# Patient Record
Sex: FEMALE | Race: BLACK OR AFRICAN AMERICAN | Employment: UNEMPLOYED | URBAN - METROPOLITAN AREA
[De-identification: names, ages, dates, MRNs, and addresses within clinical notes are randomized per-mention and may not be internally consistent; named-entity substitution may affect disease eponyms.]

---

## 2017-01-23 PROBLEM — Z96.0 RETAINED URETHRAL STENT: Status: ACTIVE | Noted: 2017-01-23

## 2017-01-23 PROBLEM — N20.0 KIDNEY STONES: Status: ACTIVE | Noted: 2017-01-23

## 2017-01-23 PROBLEM — N17.9 AKI (ACUTE KIDNEY INJURY) (HCC): Status: ACTIVE | Noted: 2017-01-23

## 2017-01-23 PROBLEM — N23 RENAL COLIC ON LEFT SIDE: Status: ACTIVE | Noted: 2017-01-23

## 2017-01-23 PROBLEM — N13.2 URETERAL STONE WITH HYDRONEPHROSIS: Status: ACTIVE | Noted: 2017-01-23

## 2017-01-30 RX ORDER — CEFAZOLIN SODIUM 2 G/50ML
2 SOLUTION INTRAVENOUS ONCE
Status: CANCELLED | OUTPATIENT
Start: 2017-01-30 | End: 2017-01-30

## 2017-02-17 ENCOUNTER — ANESTHESIA EVENT (OUTPATIENT)
Dept: SURGERY | Age: 62
End: 2017-02-17
Payer: MEDICARE

## 2017-02-20 ENCOUNTER — APPOINTMENT (OUTPATIENT)
Dept: GENERAL RADIOLOGY | Age: 62
End: 2017-02-20
Attending: UROLOGY
Payer: MEDICARE

## 2017-02-20 ENCOUNTER — ANESTHESIA (OUTPATIENT)
Dept: SURGERY | Age: 62
End: 2017-02-20
Payer: MEDICARE

## 2017-02-20 ENCOUNTER — HOSPITAL ENCOUNTER (OUTPATIENT)
Age: 62
Setting detail: OUTPATIENT SURGERY
Discharge: HOME | End: 2017-02-20
Attending: UROLOGY | Admitting: UROLOGY
Payer: MEDICARE

## 2017-02-20 ENCOUNTER — SURGERY (OUTPATIENT)
Age: 62
End: 2017-02-20

## 2017-02-20 VITALS
WEIGHT: 109 LBS | HEART RATE: 83 BPM | BODY MASS INDEX: 18.16 KG/M2 | TEMPERATURE: 97.9 F | HEIGHT: 65 IN | DIASTOLIC BLOOD PRESSURE: 70 MMHG | OXYGEN SATURATION: 97 % | RESPIRATION RATE: 20 BRPM | SYSTOLIC BLOOD PRESSURE: 108 MMHG

## 2017-02-20 LAB
BUN BLD-MCNC: 37 MG/DL (ref 7–18)
CHLORIDE BLD-SCNC: 109 MMOL/L (ref 100–108)
GLUCOSE BLD STRIP.AUTO-MCNC: 107 MG/DL (ref 74–106)
HCT VFR BLD CALC: 45 % (ref 36–49)
HGB BLD-MCNC: 15.3 G/DL (ref 12–16)
POTASSIUM BLD-SCNC: 4.2 MMOL/L (ref 3.5–5.5)
SODIUM BLD-SCNC: 142 MMOL/L (ref 136–145)

## 2017-02-20 PROCEDURE — C1769 GUIDE WIRE: HCPCS | Performed by: UROLOGY

## 2017-02-20 PROCEDURE — 74011250636 HC RX REV CODE- 250/636: Performed by: ANESTHESIOLOGY

## 2017-02-20 PROCEDURE — 76210000016 HC OR PH I REC 1 TO 1.5 HR: Performed by: UROLOGY

## 2017-02-20 PROCEDURE — 76010000161 HC OR TIME 1 TO 1.5 HR INTENSV-TIER 1: Performed by: UROLOGY

## 2017-02-20 PROCEDURE — C1758 CATHETER, URETERAL: HCPCS | Performed by: UROLOGY

## 2017-02-20 PROCEDURE — 74011250636 HC RX REV CODE- 250/636

## 2017-02-20 PROCEDURE — 82947 ASSAY GLUCOSE BLOOD QUANT: CPT

## 2017-02-20 PROCEDURE — 77030032490 HC SLV COMPR SCD KNE COVD -B: Performed by: UROLOGY

## 2017-02-20 PROCEDURE — 77030020269 HC MISC IMPL: Performed by: UROLOGY

## 2017-02-20 PROCEDURE — 74011250636 HC RX REV CODE- 250/636: Performed by: NURSE ANESTHETIST, CERTIFIED REGISTERED

## 2017-02-20 PROCEDURE — 74011250637 HC RX REV CODE- 250/637: Performed by: NURSE ANESTHETIST, CERTIFIED REGISTERED

## 2017-02-20 PROCEDURE — 74011250636 HC RX REV CODE- 250/636: Performed by: UROLOGY

## 2017-02-20 PROCEDURE — 74011636320 HC RX REV CODE- 636/320: Performed by: UROLOGY

## 2017-02-20 PROCEDURE — 76060000033 HC ANESTHESIA 1 TO 1.5 HR: Performed by: UROLOGY

## 2017-02-20 PROCEDURE — 77030018836 HC SOL IRR NACL ICUM -A: Performed by: UROLOGY

## 2017-02-20 PROCEDURE — 77030020782 HC GWN BAIR PAWS FLX 3M -B: Performed by: UROLOGY

## 2017-02-20 PROCEDURE — 82360 CALCULUS ASSAY QUANT: CPT | Performed by: UROLOGY

## 2017-02-20 PROCEDURE — 77030012961 HC IRR KT CYSTO/TUR ICUM -A: Performed by: UROLOGY

## 2017-02-20 PROCEDURE — 74420 UROGRAPHY RTRGR +-KUB: CPT

## 2017-02-20 PROCEDURE — 77030006974 HC BSKT URET RTVR BSC -C: Performed by: UROLOGY

## 2017-02-20 PROCEDURE — 74011000250 HC RX REV CODE- 250

## 2017-02-20 PROCEDURE — 76210000026 HC REC RM PH II 1 TO 1.5 HR: Performed by: UROLOGY

## 2017-02-20 RX ORDER — DEXAMETHASONE SODIUM PHOSPHATE 4 MG/ML
INJECTION, SOLUTION INTRA-ARTICULAR; INTRALESIONAL; INTRAMUSCULAR; INTRAVENOUS; SOFT TISSUE AS NEEDED
Status: DISCONTINUED | OUTPATIENT
Start: 2017-02-20 | End: 2017-02-20 | Stop reason: HOSPADM

## 2017-02-20 RX ORDER — OXYCODONE AND ACETAMINOPHEN 5; 325 MG/1; MG/1
1-2 TABLET ORAL
Qty: 24 TAB | Refills: 0 | Status: ON HOLD | OUTPATIENT
Start: 2017-02-20 | End: 2022-03-11

## 2017-02-20 RX ORDER — SODIUM CHLORIDE 0.9 % (FLUSH) 0.9 %
5-10 SYRINGE (ML) INJECTION AS NEEDED
Status: DISCONTINUED | OUTPATIENT
Start: 2017-02-20 | End: 2017-02-20 | Stop reason: HOSPADM

## 2017-02-20 RX ORDER — CIPROFLOXACIN 500 MG/1
500 TABLET ORAL 2 TIMES DAILY
Qty: 6 TAB | Refills: 0 | Status: ON HOLD | OUTPATIENT
Start: 2017-02-20 | End: 2022-03-11

## 2017-02-20 RX ORDER — FAMOTIDINE 20 MG/1
20 TABLET, FILM COATED ORAL ONCE
Status: COMPLETED | OUTPATIENT
Start: 2017-02-20 | End: 2017-02-20

## 2017-02-20 RX ORDER — MIDAZOLAM HYDROCHLORIDE 1 MG/ML
INJECTION, SOLUTION INTRAMUSCULAR; INTRAVENOUS AS NEEDED
Status: DISCONTINUED | OUTPATIENT
Start: 2017-02-20 | End: 2017-02-20 | Stop reason: HOSPADM

## 2017-02-20 RX ORDER — PROPOFOL 10 MG/ML
INJECTION, EMULSION INTRAVENOUS AS NEEDED
Status: DISCONTINUED | OUTPATIENT
Start: 2017-02-20 | End: 2017-02-20 | Stop reason: HOSPADM

## 2017-02-20 RX ORDER — CEFAZOLIN SODIUM 2 G/50ML
2 SOLUTION INTRAVENOUS ONCE
Status: COMPLETED | OUTPATIENT
Start: 2017-02-20 | End: 2017-02-20

## 2017-02-20 RX ORDER — OXYCODONE AND ACETAMINOPHEN 5; 325 MG/1; MG/1
1-2 TABLET ORAL
Status: DISCONTINUED | OUTPATIENT
Start: 2017-02-20 | End: 2017-02-20 | Stop reason: HOSPADM

## 2017-02-20 RX ORDER — ONDANSETRON 2 MG/ML
INJECTION INTRAMUSCULAR; INTRAVENOUS AS NEEDED
Status: DISCONTINUED | OUTPATIENT
Start: 2017-02-20 | End: 2017-02-20 | Stop reason: HOSPADM

## 2017-02-20 RX ORDER — TAMSULOSIN HYDROCHLORIDE 0.4 MG/1
0.4 CAPSULE ORAL DAILY
Qty: 14 CAP | Refills: 0 | Status: ON HOLD | OUTPATIENT
Start: 2017-02-20 | End: 2022-03-11

## 2017-02-20 RX ORDER — SODIUM CHLORIDE 0.9 % (FLUSH) 0.9 %
5-10 SYRINGE (ML) INJECTION EVERY 8 HOURS
Status: DISCONTINUED | OUTPATIENT
Start: 2017-02-20 | End: 2017-02-20 | Stop reason: HOSPADM

## 2017-02-20 RX ORDER — KETOROLAC TROMETHAMINE 30 MG/ML
INJECTION, SOLUTION INTRAMUSCULAR; INTRAVENOUS AS NEEDED
Status: DISCONTINUED | OUTPATIENT
Start: 2017-02-20 | End: 2017-02-20 | Stop reason: HOSPADM

## 2017-02-20 RX ORDER — MORPHINE SULFATE 10 MG/ML
INJECTION, SOLUTION INTRAMUSCULAR; INTRAVENOUS AS NEEDED
Status: DISCONTINUED | OUTPATIENT
Start: 2017-02-20 | End: 2017-02-20 | Stop reason: HOSPADM

## 2017-02-20 RX ORDER — LIDOCAINE HYDROCHLORIDE 20 MG/ML
INJECTION, SOLUTION EPIDURAL; INFILTRATION; INTRACAUDAL; PERINEURAL AS NEEDED
Status: DISCONTINUED | OUTPATIENT
Start: 2017-02-20 | End: 2017-02-20 | Stop reason: HOSPADM

## 2017-02-20 RX ORDER — DEXTROSE 50 % IN WATER (D50W) INTRAVENOUS SYRINGE
25-50 AS NEEDED
Status: DISCONTINUED | OUTPATIENT
Start: 2017-02-20 | End: 2017-02-20 | Stop reason: HOSPADM

## 2017-02-20 RX ORDER — HYDROMORPHONE HYDROCHLORIDE 2 MG/ML
0.5 INJECTION, SOLUTION INTRAMUSCULAR; INTRAVENOUS; SUBCUTANEOUS
Status: DISCONTINUED | OUTPATIENT
Start: 2017-02-20 | End: 2017-02-20 | Stop reason: HOSPADM

## 2017-02-20 RX ORDER — MAGNESIUM SULFATE 100 %
4 CRYSTALS MISCELLANEOUS AS NEEDED
Status: DISCONTINUED | OUTPATIENT
Start: 2017-02-20 | End: 2017-02-20 | Stop reason: HOSPADM

## 2017-02-20 RX ORDER — INSULIN LISPRO 100 [IU]/ML
INJECTION, SOLUTION INTRAVENOUS; SUBCUTANEOUS ONCE
Status: DISCONTINUED | OUTPATIENT
Start: 2017-02-20 | End: 2017-02-20 | Stop reason: HOSPADM

## 2017-02-20 RX ORDER — SODIUM CHLORIDE, SODIUM LACTATE, POTASSIUM CHLORIDE, CALCIUM CHLORIDE 600; 310; 30; 20 MG/100ML; MG/100ML; MG/100ML; MG/100ML
75 INJECTION, SOLUTION INTRAVENOUS CONTINUOUS
Status: DISCONTINUED | OUTPATIENT
Start: 2017-02-20 | End: 2017-02-20 | Stop reason: HOSPADM

## 2017-02-20 RX ADMIN — MIDAZOLAM HYDROCHLORIDE 2 MG: 1 INJECTION, SOLUTION INTRAMUSCULAR; INTRAVENOUS at 08:28

## 2017-02-20 RX ADMIN — FAMOTIDINE 20 MG: 20 TABLET ORAL at 06:42

## 2017-02-20 RX ADMIN — HYDROMORPHONE HYDROCHLORIDE 0.5 MG: 2 INJECTION, SOLUTION INTRAMUSCULAR; INTRAVENOUS; SUBCUTANEOUS at 10:12

## 2017-02-20 RX ADMIN — KETOROLAC TROMETHAMINE 30 MG: 30 INJECTION, SOLUTION INTRAMUSCULAR; INTRAVENOUS at 08:52

## 2017-02-20 RX ADMIN — CEFAZOLIN SODIUM 2 G: 2 SOLUTION INTRAVENOUS at 08:36

## 2017-02-20 RX ADMIN — LIDOCAINE HYDROCHLORIDE 100 MG: 20 INJECTION, SOLUTION EPIDURAL; INFILTRATION; INTRACAUDAL; PERINEURAL at 08:33

## 2017-02-20 RX ADMIN — ONDANSETRON 4 MG: 2 INJECTION INTRAMUSCULAR; INTRAVENOUS at 08:52

## 2017-02-20 RX ADMIN — SODIUM CHLORIDE, SODIUM LACTATE, POTASSIUM CHLORIDE, AND CALCIUM CHLORIDE 75 ML/HR: 600; 310; 30; 20 INJECTION, SOLUTION INTRAVENOUS at 06:42

## 2017-02-20 RX ADMIN — PROPOFOL 150 MG: 10 INJECTION, EMULSION INTRAVENOUS at 08:33

## 2017-02-20 RX ADMIN — MORPHINE SULFATE 10 MG: 10 INJECTION, SOLUTION INTRAMUSCULAR; INTRAVENOUS at 08:28

## 2017-02-20 RX ADMIN — DEXAMETHASONE SODIUM PHOSPHATE 4 MG: 4 INJECTION, SOLUTION INTRA-ARTICULAR; INTRALESIONAL; INTRAMUSCULAR; INTRAVENOUS; SOFT TISSUE at 08:52

## 2017-02-20 RX ADMIN — HYDROMORPHONE HYDROCHLORIDE 0.5 MG: 2 INJECTION, SOLUTION INTRAMUSCULAR; INTRAVENOUS; SUBCUTANEOUS at 10:22

## 2017-02-20 RX ADMIN — SODIUM CHLORIDE, SODIUM LACTATE, POTASSIUM CHLORIDE, AND CALCIUM CHLORIDE: 600; 310; 30; 20 INJECTION, SOLUTION INTRAVENOUS at 08:28

## 2017-02-20 RX ADMIN — IOTHALAMATE MEGLUMINE 12 ML: 600 INJECTION INTRAVASCULAR at 08:50

## 2017-02-20 NOTE — OP NOTES
[address]    Name:  Manuela Flores  MR#:  [id number]  :  1955  Account #:  [de-identified]  Date of Adm:  2017  Date of Surgery:  2017      PREOPERATIVE DIAGNOSIS: Left distal ureteral stones,  hydronephrosis, retained ureteral stent (status post left ureteral stent  placement 2016). POSTOPERATIVE DIAGNOSIS: Left distal ureteral stones,  hydronephrosis, retained ureteral stent (status post left ureteral stent  placement 2016). PROCEDURES PERFORMED: Cystoscopy with left retrograde  pyelogram, left ureteroscopy, laser lithotripsy, basket stone extraction,  ureteral stent exchange, and fluoroscopy roughly 1 hour. ANESTHESIA: General.    ANTIBIOTICS: Ancef. Urine culture negative. SPECIMENS REMOVED: Left ureteral stone. ESTIMATED BLOOD LOSS: 1 mL. DRAINS: A 6 x 24-Maltese double-J ureteral stent. FINDINGS: An impacted 1.3 cm distal ureteral stone and just proximal  to that was a 4 mm distal ureteral stone, and moderate hydronephrosis  seen on retrograde pyelogram.    DISPOSITION: PACU and home. INDICATIONS FOR PROCEDURE: This is a 64-year-old lady who  presented to the [facility] system on 2016, with left renal colic  and sepsis, and was found to have a 1.3 cm impacted distal stone and  a 4 mm proximal to the distal stone with moderate to severe  hydronephrosis. A ureteral stent was placed at that time and she now  presents for stone management after a period of infection and sepsis  treated. DESCRIPTION OF PROCEDURE: After consent was obtained, she  was brought to the operating room and placed in a supine position. Anesthesia was administered. She was placed in the dorsal lithotomy  position, prepped and draped in normal sterile fashion. Intravenous  antibiotics were given. A timeout was conducted. Using a 30-degree lens scope and a Cristina-Maltese sheath,  we performed a cystourethroscopy.  The urethra and the bladder  appeared normal. There was no stone or lesion found in the bladder. The ureteral orifices were in normal position. We found a left stent  emanating from the left ureteral orifice. This was grasped with a  flexible grasper and we passed a Sensor wire through this stent up into  the kidney, which was verified fluoroscopically. The ureteral stent was  then removed. We performed a semi-rigid ureteroscopy, which was  placed in the distal ureter. A retrograde pyelogram revealed a  radiopaque stone in the distal ureter. Also, there was moderate  hydroureteronephrosis down to the level of the stone. We then used a  360 micron laser fiber to break down the stone into several fragments. We used a 1.9 Nitinol basket to remove the fragmented stones. We  also encountered the stone proximal to that, which was 4 mm, and this  was removed. We performed a semi-rigid ureteroscopy all the way to  the renal pelvis, and there was no other stone encountered. With the  sensor wire left in place, a 6 x 24-Portuguese double-J ureteral stent was  backloaded over the wire into the proximal curl in the renal pelvis and a  distal curl in the bladder. The bladder was emptied and bladder  stones removed and sent for analysis. The patient was awakened from  anesthesia and transferred to PACU in stable condition.         MD RASHAUN Albert / ITZ  D:  02/20/2017   09:37  T:  02/20/2017   10:48  Job #:  [id number]

## 2017-02-20 NOTE — H&P (VIEW-ONLY)
Assessment:        ICD-10-CM ICD-9-CM    1. Ureteral stone with hydronephrosis N13.2 592.1 CANCELED: URINE C&S     591    2. Renal colic on left side X52 788.0    3. Retained urethral stent Z96.0 V43.89    4. Kidney stones N20.0 592.0 CANCELED: URINE C&S   5. TIM (acute kidney injury) ([facility] [address] N17.9 584.9        Plan:  1. Kidney stones    S/P Cystoscopy, bilateral retrograde pyelograms, and left double-J stent placement on 12/29/16. Indicated for a Left distal 1.3 cm ureteral calculus, gross hematuria and acute kidney injury with associated left hydronephrosis and forniceal rupture. 2. Left Flank Pain    Rx provided for Percocet 5-325mg #24   Continue on Naprosyn for pain as needed    Continue on Zofran for nausea    Continue pushing Fluids     3. Will Plan for cystoscopy, ureteroscopy, holmium laser lithotripsy, left JJ stent exchange. Surgery Letter sent    Needs medical clearance by PCP. Hold Aspirin if possible (h/o stroke )    Discussion:     Risks and benefits of ureteroscopy were reviewed including but not limited to infection, bleeding, pain, ureteral injury which could require open surgery versus prolonged indwelling if ureteral perforation occurs, persistent stone disease, requirement for staged procedure, possible stent, and global anesthesia risks including DVT, MI, CVA, and even death. Patient expressed understanding and desires to proceed with ureteroscopy. Chief Complaint   Patient presents with    Kidney Stone     CT Done 1-18-17       History of present Illness:    Nicholas King is a 59 y.o. female who presents today in consultation for kidney stones. She presented to [place] [address] ER on 12/28/16 c/o severe left sided pain. CT A/P noted an obstructing left ureteral stone with left hydro. Pt was then transferred to [facility] for an emergent Cystoscopy, bilateral retrograde pyelograms, and left double-J stent placement on 12/29/16.    Indicated for a Left distal 1.3 cm ureteral calculus, gross hematuria and acute kidney injury with associated left hydronephrosis and forniceal rupture. She reports that she had one episode of gross hematuria one year ago- with out pain. No other episodes of kidney stones that she knows of. Denies any recent UTI's. Denies any other urinary complaints at this time. Today she reports left sided flank pain. Denies any gross hematuria, dysuria, frequency, or urgency. No f/n/v/c. Pt has MS, is followed by neurology. Pt is a retired [employer]. KENIA 1/18/2017   IMPRESSION  Left hydronephrosis has resolved.  Ureteral stent not well visualized proximally. Nonobstructing right renal calculus.  Mild right renal atrophy. CT A/P 12/28/16   IMPRESSION:  1. Significant left hydronephrosis with significant left perinephric fluid. This probably represents acute obstruction secondary to a large distal left ureteral stone. There is another stone in the distal left ureter which appears small and probably nonobstructive. No intra-renal calcification on the left. 2. Nonobstructive right nephrolithiasis. 3. Minor dependent atelectasis in the lungs with some minor bronchiectasis. 4. Nonspecific bowel gas could represent a mild ileus. There is significant left hydronephrosis with significant fluid in the left perinephric space. The left kidney also appears larger than the right with the left measuring about 11.7 cm and the right about 9.6 cm in length. No right hydronephrosis. There is nonobstructive right nephrolithiasis with a stone in the upper pole measuring about 4 mm no left nephrolithiasis but there is significant left hydroureter down to a large ovoid stone at the left ureterovesical junction which measures 1.3 x 0.77 may represent a smaller more proximal left ureteral stone measures about 4 mm.  No right hydroureter.             Review of Systems  Constitutional: Fever: No  Skin: Rash: No  HEENT: Hearing difficulty: No  Eyes: Blurred vision: Yes  Cardiovascular: Chest pain: No  Respiratory: Shortness of breath: No  Gastrointestinal: Nausea/vomiting: No  Musculoskeletal: Back pain: Yes  Neurological: Weakness: No  Psychological: Memory loss: No  Comments/additional findings:     Past Medical History   Diagnosis Date    Gross hematuria     Hypertension     Kidney stones     MS (congenital mitral stenosis)     Stroke (cerebrum) (HCC)      Past Surgical History   Procedure Laterality Date    Hx coronary stent placement       Social History     Social History    Marital status:      Spouse name: N/A    Number of children: N/A    Years of education: N/A     Occupational History    Not on file. Social History Main Topics    Smoking status: Former Smoker    Smokeless tobacco: Never Used    Alcohol use No    Drug use: Not on file    Sexual activity: Not on file     Other Topics Concern    Not on file     Social History Narrative    No narrative on file     No family history on file. Current Outpatient Prescriptions   Medication Sig Dispense Refill    tamsulosin (FLOMAX) 0.4 mg capsule       simvastatin (ZOCOR) 20 mg tablet       lisinopril (PRINIVIL, ZESTRIL) 20 mg tablet       hydroCHLOROthiazide (HYDRODIURIL) 25 mg tablet       cyclobenzaprine (FLEXERIL) 10 mg tablet       clonazePAM (KLONOPIN) 0.5 mg tablet       multivitamin (ONE A DAY) tablet Take 1 Tab by mouth daily.  aspirin (ASPIRIN) 325 mg tablet Take 325 mg by mouth daily.  BACLOFEN PO Take  by mouth.  ergocalciferol (VITAMIN D2) 50,000 unit capsule Take 50,000 Units by mouth.  oxyCODONE-acetaminophen (PERCOCET) 5-325 mg per tablet Take 1-2 Tabs by mouth every four (4) hours as needed for Pain. Max Daily Amount: 12 Tabs.  24 Tab 0     Allergies   Allergen Reactions    Codeine Shortness of Breath          Physical exam:    Visit Vitals    /80    Ht 5' 5\" (1.651 m)    Wt 110 lb (49.9 kg)    BMI 18.3 kg/m2     Constitutional: Well developed, well-nourished female in no acute distress. CV:  No peripheral swelling noted  Respiratory: No respiratory distress or difficulties  Abdomen:  Soft and nontender. No masses. Skin: No bruising or rashes. No petechia. Neuro/Psych:  Patient with appropriate affect. Alert and oriented. Lymphatic:   No enlargement of inguinal lymph nodes. No results found for this or any previous visit. Angle Marlow MD       Medical Documentation is provided with the assistance of Mejai Whyte, medical scribe for Angle Marlow MD

## 2017-02-20 NOTE — PERIOP NOTES
I have reviewed discharge instructions with the patient and daughter, Tati Hall. The patient and daughter verbalized understanding. Patient armband removed and shredded.

## 2017-02-20 NOTE — ANESTHESIA PREPROCEDURE EVALUATION
Anesthetic History               Review of Systems / Medical History  Patient summary reviewed and pertinent labs reviewed    Pulmonary          Smoker         Neuro/Psych       CVA: no residual symptoms  TIA     Cardiovascular    Hypertension: well controlled              Exercise tolerance: <4 METS  Comments: MS   GI/Hepatic/Renal  Within defined limits              Endo/Other  Within defined limits           Other Findings   Comments: Current Smoker? YES       Elective Surgery? Yes       Abstained from smoking 24 hours prior to anesthesia? NO    Risk Factors for Postoperative nausea/vomiting:       History of postoperative nausea/vomiting? NO       Female? YES       Motion sickness? NO       Intended opioid administration for postoperative analgesia?   NO           Physical Exam    Airway  Mallampati: II  TM Distance: 4 - 6 cm  Neck ROM: normal range of motion   Mouth opening: Normal     Cardiovascular  Regular rate and rhythm,  S1 and S2 normal,  no murmur, click, rub, or gallop             Dental    Dentition: Upper partial plate and Full lower dentures     Pulmonary  Breath sounds clear to auscultation               Abdominal  GI exam deferred       Other Findings            Anesthetic Plan    ASA: 3  Anesthesia type: general          Induction: Intravenous  Anesthetic plan and risks discussed with: Patient and Family

## 2017-02-20 NOTE — DISCHARGE INSTRUCTIONS
Cystoscopy: What to Expect at [facility]    A cystoscopy is a procedure that lets a doctor look inside of the bladder and the urethra. The urethra is the tube that carries urine from the bladder to outside the body. The doctor uses a thin, lighted tube called a cystoscope to look for kidney or bladder stones, tumors, bleeding, or infection. After the cystoscopy, your urethra may be sore at first, and it may burn when you urinate for the first few days after the procedure. You may feel the need to urinate more often, and your urine may be pink. These symptoms should get better in 1 or 2 days. You will probably be able to go back to work or most of your usual activities in 1 or 2 days. This care sheet gives you a general idea about how long it will take for you to recover. But each person recovers at a different pace. Follow the steps below to get better as quickly as possible. How can you care for yourself at home? Activity  · Rest when you feel tired. Getting enough sleep will help you recover. · Try to walk each day. Start by walking a little more than you did the day before. Bit by bit, increase the amount you walk. Walking boosts blood flow and helps prevent pneumonia and constipation. · Avoid strenuous activities, such as bicycle riding, jogging, weight lifting, or aerobic exercise, until your doctor says it is okay. · Ask your doctor when you can drive again. · Most people are able to return to work within 1 or 2 days after the procedure. · You may shower and take baths as usual.  · Ask your doctor when it is okay for you to have sex. Diet  · You can eat your normal diet. If your stomach is upset, try bland, low-fat foods like plain rice, broiled chicken, toast, and yogurt. · Drink plenty of fluids (unless your doctor tells you not to). Medicines  · Take pain medicines exactly as directed. ¨ If the doctor gave you a prescription medicine for pain, take it as prescribed.   ¨ If you are not taking a prescription pain medicine, ask your doctor if you can take an over-the-counter medicine. · If you think your pain medicine is making you sick to your stomach:  ¨ Take your medicine after meals (unless your doctor has told you not to). ¨ Ask your doctor for a different pain medicine. · If your doctor prescribed antibiotics, take them as directed. Do not stop taking them just because you feel better. You need to take the full course of antibiotics. Follow-up care is a key part of your treatment and safety. Be sure to make and go to all appointments, and call your doctor if you are having problems. It's also a good idea to know your test results and keep a list of the medicines you take. When should you call for help? Call 911 anytime you think you may need emergency care. For example, call if:  · You passed out (lost consciousness). · You have severe trouble breathing. · You have sudden chest pain and shortness of breath, or you cough up blood. · You have severe belly pain. Call your doctor now or seek immediate medical care if:  · You are sick to your stomach or cannot keep fluids down. · Your urine is still red or you see blood clots after you have urinated several times. · You have trouble passing urine or stool, especially if you have pain or swelling in your lower belly. · You have signs of a blood clot, such as:  ¨ Pain in your calf, back of the knee, thigh, or groin. ¨ Redness and swelling in your leg or groin. · You develop a fever or severe chills. · You have pain in your back just below your rib cage. This is called flank pain. Watch closely for changes in your health, and be sure to contact your doctor if:  · You have pain or burning when you urinate. A burning feeling is normal for a day or two after the test, but call if it does not get better. · You have a frequent urge to urinate but can pass only small amounts of urine. · Your urine is pink, red, or cloudy, or smells bad. It is normal for the urine to have a pinkish color for a few days after the test, but call if it does not get better. Where can you learn more? Go to http://joaquin-chanel.info/. Enter F209 in the search box to learn more about \"Cystoscopy: What to Expect at Home. \"  Current as of: August 12, 2016  Content Version: 11.1  © 2006-2016 [employer]. Care instructions adapted under license by [employer] (which disclaims liability or warranty for this information). If you have questions about a medical condition or this instruction, always ask your healthcare professional. [place] [address] any warranty or liability for your use of this information. Laser Lithotripsy: What to Expect at [address] lithotripsy is a way to treat kidney stones. This treatment uses a laser to break kidney stones into tiny pieces. For several hours after the procedure you may have a burning feeling when you urinate. You may feel the urge to go even if you don't need to. This feeling should go away within a day. Drinking a lot of water can help. Your doctor also may advise you to take medicine that numbs the burning. This medicine is called phenazopyridine. It is available by prescription and over the counter. Brand names include Pyridium and Uristat. Your doctor may prescribe an antibiotic. This will help prevent an infection. You may have some blood in your urine for 2 or 3 days. Your doctor may have placed a small tube inside one of your ureters. Ureters are the tubes that connect the kidneys to the bladder. The small tube the doctor may have placed is called a stent. It may help the stone fragments pass through your body. Your doctor may remove the stent in a few weeks. Most stone fragments that are not removed pass out of the body within 24 hours. But sometimes it can take many weeks.  If you have a large stone, you may need to come back for more treatments. This care sheet gives you a general idea about how long it will take for you to recover. But each person recovers at a different pace. Follow the steps below to feel better as quickly as possible. How can you care for yourself at home? Activity  · Rest as much as you need to after you go home. · You may do your regular activities. But avoid hard exercise or sports for about a week or until there is no blood in your urine. Diet  · You can eat your normal diet after lithotripsy. · Continue to drink plenty of fluids, enough so that your urine is light yellow or clear like water. If you have kidney, heart, or liver disease and have to limit fluids, talk with your doctor before you increase the amount of fluids you drink. Medicines  · Your doctor will tell you if and when you can restart your medicines. He or she will also give you instructions about taking any new medicines. · If you take blood thinners, such as warfarin (Coumadin), clopidogrel (Plavix), or aspirin, be sure to talk to your doctor. He or she will tell you if and when to start taking those medicines again. Make sure that you understand exactly what your doctor wants you to do. · If you take medicine to stop the burning when you urinate, take it exactly as recommended. Call your doctor if you think you are having a problem with your medicine. This medicine may color your urine orange or red. This is normal. You will get more details on the specific medicine your doctor recommends. · If your doctor prescribed antibiotics, take them as directed. Do not stop taking them just because you feel better. You need to take the full course of antibiotics. · Be safe with medicines. Read and follow all instructions on the label. ¨ If the doctor gave you a prescription medicine for pain, take it as prescribed. ¨ If you are not taking a prescription pain medicine, ask your doctor if you can take acetaminophen (Tylenol).  Do not take ibuprofen (Advil, Motrin) or naproxen (Aleve) or similar medicines unless your doctor tells you to. ¨ Do not take two or more pain medicines at the same time unless the doctor told you to. Many pain medicines have acetaminophen, which is Tylenol. Too much acetaminophen (Tylenol) can be harmful. Heat  · Take a warm bath. This may soothe the burning. Other instructions  · Urinate through the strainer the doctor gives you. Save any stone pieces, including those that look like sand or gravel. Take these to your doctor. This will help your doctor find the cause of your stones. Follow-up care is a key part of your treatment and safety. Be sure to make and go to all appointments, and call your doctor if you are having problems. It's also a good idea to know your test results and keep a list of the medicines you take. When should you call for help? Call your doctor now or seek immediate medical care if:  · You have severe pain that does not get better after you take pain medicine. · You have severe pain when you urinate. · You have a fever over 100°F.  · You are not able to urinate within 6 to 8 hours after the procedure. · Your urine is still bright red 48 hours after the procedure. Watch closely for any changes in your health, and be sure to contact your doctor if:  · You have pain or burning when you urinate. A burning sensation is normal for a day or two after the test, but call if it does not get better. · You have a frequent urge to urinate but can pass only small amounts of urine. · Your urine is pink or cloudy or smells bad. It is normal for the urine to have a pinkish color for 2 or 3 days after the test, but call if it does not get better. · You do not get better as expected. Where can you learn more? Go to http://joaquin-chanel.info/. Enter Q239 in the search box to learn more about \"Laser Lithotripsy: What to Expect at Home. \"  Current as of: November 20, 2015  Content Version: 11.1  © 9767-6889 [employer], [employer]. Care instructions adapted under license by [employer] (which disclaims liability or warranty for this information). If you have questions about a medical condition or this instruction, always ask your healthcare professional. [address] any warranty or liability for your use of this information. DISCHARGE SUMMARY from Nurse    The following personal items are in your possession at time of discharge:    Dental Appliances: None  Visual Aid: Glasses        Jewelry: None  Clothing: Pants, Shirt, Undergarments, Socks, Footwear  Other Valuables: None   PATIENT INSTRUCTIONS:    After general anesthesia or intravenous sedation, for 24 hours or while taking prescription Narcotics:  · Limit your activities  · Do not drive and operate hazardous machinery  · Do not make important personal or business decisions  · Do  not drink alcoholic beverages  · If you have not urinated within 8 hours after discharge, please contact your surgeon on call. Report the following to your surgeon:  · Excessive pain, swelling, redness or odor of or around the surgical area  · Temperature over 100.5  · Nausea and vomiting lasting longer than 4 hours or if unable to take medications  · Any signs of decreased circulation or nerve impairment to extremity: change in color, persistent  numbness, tingling, coldness or increase pain  · Any questions    *  Please give a list of your current medications to your Primary Care Provider. *  Please update this list whenever your medications are discontinued, doses are      changed, or new medications (including over-the-counter products) are added. *  Please carry medication information at all times in case of emergency situations.     These are general instructions for a healthy lifestyle:    No smoking/ No tobacco products/ Avoid exposure to second hand smoke    Surgeon General's Warning:  Quitting smoking now greatly reduces serious risk to your health. Obesity, smoking, and sedentary lifestyle greatly increases your risk for illness    A healthy diet, regular physical exercise & weight monitoring are important for maintaining a healthy lifestyle    You may be retaining fluid if you have a history of heart failure or if you experience any of the following symptoms:  Weight gain of 3 pounds or more overnight or 5 pounds in a week, increased swelling in our hands or feet or shortness of breath while lying flat in bed. Please call your doctor as soon as you notice any of these symptoms; do not wait until your next office visit. Recognize signs and symptoms of STROKE:    F-face looks uneven    A-arms unable to move or move unevenly    S-speech slurred or non-existent    T-time-call 911 as soon as signs and symptoms begin-DO NOT go       Back to bed or wait to see if you get better-TIME IS BRAIN. Warning Signs of HEART ATTACK     Call 911 if you have these symptoms:   Chest discomfort. Most heart attacks involve discomfort in the center of the chest that lasts more than a few minutes, or that goes away and comes back. It can feel like uncomfortable pressure, squeezing, fullness, or pain.  Discomfort in other areas of the upper body. Symptoms can include pain or discomfort in one or both arms, the back, neck, jaw, or stomach.  Shortness of breath with or without chest discomfort.  Other signs may include breaking out in a cold sweat, nausea, or lightheadedness. Don't wait more than five minutes to call 911 - MINUTES MATTER! Fast action can save your life. Calling 911 is almost always the fastest way to get lifesaving treatment. Emergency Medical Services staff can begin treatment when they arrive -- up to an hour sooner than if someone gets to the hospital by car. The discharge information has been reviewed with the patient and daughter. The patient and daughter verbalized understanding.     Discharge medications reviewed with the patient and daughter and appropriate educational materials and side effects teaching were provided. Ciprofloxacin (By mouth)   Ciprofloxacin ([id number]-sin)  Treats infections. This medicine is a quinolone antibiotic. Brand Name(s):Cipro   There may be other brand names for this medicine. When This Medicine Should Not Be Used: This medicine is not right for everyone. Do not use if you had an allergic reaction to ciprofloxacin or similar medicines. How to Use This Medicine:   Liquid, Tablet, Long Acting Tablet  · Take this medicine as directed, and take it at the same time each day. · You may take this medicine with or without food. Do not take this medicine with only a source of calcium, such as milk, yogurt, or juice that contains added calcium. You may have foods or drinks that contain calcium as part of a larger meal.  · Swallow the tablet whole. Do not break, crush, or chew it. · Oral liquid: Shake for at least 15 seconds just before each use. The liquid has small beads floating in it. Do not chew the beads when you drink the liquid. Measure the oral liquid medicine with a marked measuring spoon, oral syringe, or medicine cup. · Drink extra fluids so you will urinate more often and help prevent kidney problems. · Take all of the medicine in your prescription to clear up your infection, even if you feel better after the first few doses. · This medicine should come with a Medication Guide. Ask your pharmacist for a copy if you do not have one. · Missed dose: Take a dose as soon as you remember. If it is almost time for your next dose, wait until then and take a regular dose. Do not take extra medicine to make up for a missed dose. · Store the medicine in a closed container at room temperature, away from heat, moisture, and direct light. Throw away any leftover liquid medicine after 14 days.   Drugs and Foods to Avoid:   Ask your doctor or pharmacist before using any other medicine, including over-the-counter medicines, vitamins, and herbal products. · Do not use this medicine together with tizanidine. · Some medicines and foods can affect how ciprofloxacin works. Tell your doctor if you are using theophylline or a steroid medicine (such as hydrocortisone, methylprednisolone, prednisone). · Tell your doctor if you are using clozapine, cyclosporine, duloxetine, lidocaine, methotrexate, olanzapine, pentoxifylline, phenytoin, probenecid, ropinirole, sildenafil, a blood thinner (such as warfarin), a diabetes medicine (such as glyburide), medicine for depression, medicine for mental illness, other medicine to treat an infection, NSAID pain medicine (such as aspirin, diclofenac, ibuprofen, naproxen, celecoxib), or medicine for heart rhythm problems (such as quinidine, procainamide, amiodarone, sotalol). · Some minerals and medicines can keep your body from absorbing this medicine. You may need to take ciprofloxacin at least 2 hours before or 6 hours after you take these medicines. These include magnesium, aluminum, calcium, zinc, iron, lanthanum, sevelamer, sucralfate, and didanosine. Ask your pharmacist for more information. · This medicine slows the digestion of caffeine, so it might affect you for longer than normal.  Warnings While Using This Medicine:   · Tell your doctor if you are pregnant or breastfeeding, or if you have kidney disease, liver disease, diabetes, heart disease, myasthenia gravis, or a history of heart rhythm problems (such as prolonged QT interval), seizures, or stroke. Tell your doctor if you have ever had tendon or joint problems, including rheumatoid arthritis, or if you have received a transplant.   · This medicine may cause the following problems:  ¨ Tendinitis and tendon rupture (may happen after treatment ends)  ¨ Liver damage  ¨ Nerve damage in the arms or legs  ¨ Severe diarrhea  ¨ Heart rhythm changes  · This medicine may make you dizzy, drowsy, or lightheaded. Do not drive or do anything that could be dangerous until you know how this medicine affects you. · This medicine can cause diarrhea. Call your doctor if the diarrhea becomes severe, does not stop, or is bloody. Do not take any medicine to stop diarrhea until you have talked to your doctor. Diarrhea can occur 2 months or more after you stop taking this medicine. · This medicine may make your skin more sensitive to sunlight. Wear sunscreen. Do not use sunlamps or tanning beds. · Call your doctor if your symptoms do not improve or if they get worse. · Keep all medicine out of the reach of children. Never share your medicine with anyone. Possible Side Effects While Using This Medicine:   Call your doctor right away if you notice any of these side effects:  · Allergic reaction: Itching or hives, swelling in your face or hands, swelling or tingling in your mouth or throat, chest tightness, trouble breathing  · Blistering, peeling, or red skin rash  · Diarrhea that may contain blood  · Fainting, dizziness, or lightheadedness  · Fast, slow, or uneven heartbeat  · Numbness, tingling, weakness, or burning pain in your hands, arms, legs, or feet  · Pain, stiffness, swelling, or bruises around your ankle, leg, shoulder, or other joint  · Seizures, severe headache, unusual thoughts or behaviors, trouble sleeping, confusion  · Unusual bleeding, bruising, or weakness  · Yellow skin or eyes  If you notice other side effects that you think are caused by this medicine, tell your doctor. Call your doctor for medical advice about side effects. You may report side effects to FDA at 8-162-FDA-8670  © 2016 [address] is for End User's use only and may not be sold, redistributed or otherwise used for commercial purposes. The above information is an  only. It is not intended as medical advice for individual conditions or treatments.  Talk to your doctor, nurse or pharmacist before following any medical regimen to see if it is safe and effective for you.

## 2017-02-20 NOTE — BRIEF OP NOTE
BRIEF OPERATIVE NOTE    Date of Procedure: 2/20/2017   Preoperative Diagnosis: Kidney stone [N20.0]  Postoperative Diagnosis: Kidney stone [N20.0]    Procedure(s):  CYSTOSCOPY/LEFT URETEROSCOPY/HOLMIUM LASER LITHOTRIPSY/STENT EXCHANGE/C-ARM  Surgeon(s) and Role:     * Kacie Saucedo MD - Primary            Surgical Staff:  Circ-1: Maeve Mohr : emoquo  Radiology Technician: Art Smallwood  Scrub Tech-1: Pam Sanchez  Event Time In   Incision Start  8:46 AM   Incision Close      Anesthesia: General   Estimated Blood Loss: 1cm  Specimens:   ID Type Source Tests Collected by Time Destination   1 : LEFT URETERAL STONE FOR STONE ANALYSIS Preservative STONES  Kacie Saucedo MD 2/20/2017  9:22 AM Pathology      Findings: impacted left distal ureteral stone 1.3cm and another distal 4mm stone   Complications: none  Implants:   Implant Name Type Inv.  Item Serial No.  Lot No. LRB No. Used Action   [employer] Ureteral Stent         [id number] Left 1 Implanted

## 2017-02-20 NOTE — PROGRESS NOTES
conducted a pre-surgery visit with Michael Gayla, who is a 59 y.o.,female. The  provided the following Interventions:  Initiated a relationship of care and support. Plan:  Chaplains will continue to follow and will provide pastoral care on an as needed/requested basis.  recommends bedside caregivers page  on duty if patient shows signs of acute spiritual or emotional distress.     [facility] Certified [address] [place] [address]   [phone]

## 2017-02-20 NOTE — ANESTHESIA POSTPROCEDURE EVALUATION
Post-Anesthesia Evaluation and Assessment    Patient: Papa Perez MRN: [id number]  SSN: [id number]    YOB: 1955  Age: 59 y.o. Sex: female       Cardiovascular Function/Vital Signs  Visit Vitals    /60    Pulse 81    Temp 37.1 °C (98.7 °F)    Resp 14    Ht 5' 5\" (1.651 m)    Wt 49.4 kg (109 lb)    SpO2 100%    BMI 18.14 kg/m2       Patient is status post general anesthesia for Procedure(s):  CYSTOSCOPY/LEFT URETEROSCOPY/HOLMIUM LASER LITHOTRIPSY/STENT EXCHANGE/C-ARM. Nausea/Vomiting: None    Postoperative hydration reviewed and adequate. Pain:  Pain Scale 1: Numeric (0 - 10) (02/20/17 1033)  Pain Intensity 1: 4 (02/20/17 1033)   Managed    Neurological Status:   Neuro (WDL): Within Defined Limits (02/20/17 0933)   At baseline    Mental Status and Level of Consciousness: Arousable    Pulmonary Status:   O2 Device: Room air (02/20/17 0941)   Adequate oxygenation and airway patent    Complications related to anesthesia: None    Post-anesthesia assessment completed.  No concerns    Signed By: Gi Little MD     February 20, 2017

## 2017-02-20 NOTE — INTERVAL H&P NOTE
H&P Update:  Teresa Taveras was seen and examined. History and physical has been reviewed. The patient has been examined.  There have been no significant clinical changes since the completion of the originally dated History and Physical.    Signed By: Kim Ignacio MD     February 20, 2017 8:28 AM

## 2017-02-20 NOTE — IP AVS SNAPSHOT
[id number] [address] Patient: Erin Barrios MRN: [id number] CJN:5/65/2029 You are allergic to the following Allergen Reactions Codeine Shortness of Breath Recent Documentation Height Weight BMI OB Status Smoking Status 1.651 m 49.4 kg 18.14 kg/m2 Menopause Former Smoker Emergency Contacts Name Discharge Info Relation Home Work Mobile [address] CAREGIVER [3] Son [22] [phone] About your hospitalization You were admitted on:  February 20, 2017 You last received care in the:  [facility] PHASE 2 RECOVERY You were discharged on:  February 20, 2017 Unit phone number:  [phone] Why you were hospitalized Your primary diagnosis was:  Not on File Providers Seen During Your Hospitalizations Provider Role Specialty Primary office phone Lindsay Johnson MD Attending Provider Urology [phone] Your Primary Care Physician (PCP) Primary Care Physician Office Phone Office Fax Daksha Walsh [phone] Follow-up Information Follow up With Details Comments Contact Info Maciej Wang MD   [address] [place], [place] [address]y [address] [facility] 
[phone] Lindsay Johnson MD Schedule an appointment as soon as possible for a visit in 1 week(s)  [address] [place] [address] 
[phone] Current Discharge Medication List  
  
START taking these medications Dose & Instructions Dispensing Information Comments Morning Noon Evening Bedtime  
 ciprofloxacin HCl 500 mg tablet Commonly known as:  CIPRO Your next dose is: Today, Tomorrow Other:  _________ Dose:  500 mg Take 1 Tab by mouth two (2) times a day. Quantity:  6 Tab Refills:  0 CONTINUE these medications which have CHANGED Dose & Instructions Dispensing Information Comments Morning Noon Evening Bedtime  
 tamsulosin 0.4 mg capsule Commonly known as:  FLOMAX What changed:   
- how much to take 
- how to take this - when to take this Your next dose is: Today, Tomorrow Other:  _________ Dose:  0.4 mg Take 1 Cap by mouth daily. Indications: take while stent in place Quantity:  14 Cap Refills:  0 CONTINUE these medications which have NOT CHANGED Dose & Instructions Dispensing Information Comments Morning Noon Evening Bedtime  
 aspirin 325 mg tablet Commonly known as:  ASPIRIN Your next dose is: Today, Tomorrow Other:  _________ Dose:  325 mg Take 325 mg by mouth daily. Refills:  0 BACLOFEN PO Your next dose is: Today, Tomorrow Other:  _________ Take  by mouth. Refills:  0  
     
   
   
   
  
 clonazePAM 0.5 mg tablet Commonly known as:  Oscar Boop Your next dose is: Today, Tomorrow Other:  _________ Refills:  0  
     
   
   
   
  
 cyclobenzaprine 10 mg tablet Commonly known as:  FLEXERIL Your next dose is: Today, Tomorrow Other:  _________ Refills:  0  
     
   
   
   
  
 hydroCHLOROthiazide 25 mg tablet Commonly known as:  HYDRODIURIL Your next dose is: Today, Tomorrow Other:  _________ Refills:  0  
     
   
   
   
  
 lisinopril 20 mg tablet Commonly known as:  Donnald Code Your next dose is: Today, Tomorrow Other:  _________ Refills:  0  
     
   
   
   
  
 multivitamin tablet Commonly known as:  ONE A DAY Your next dose is: Today, Tomorrow Other:  _________ Dose:  1 Tab Take 1 Tab by mouth daily. Refills:  0  
     
   
   
   
  
 oxyCODONE-acetaminophen 5-325 mg per tablet Commonly known as:  PERCOCET  
   
 Your next dose is: Today, Tomorrow Other:  _________ Dose:  1-2 Tab Take 1-2 Tabs by mouth every four (4) hours as needed for Pain. Max Daily Amount: 12 Tabs. Quantity:  24 Tab Refills:  0  
     
   
   
   
  
 simvastatin 20 mg tablet Commonly known as:  ZOCOR Your next dose is: Today, Tomorrow Other:  _________ Refills:  0  
     
   
   
   
  
 VITAMIN D2 50,000 unit capsule Generic drug:  ergocalciferol Your next dose is: Today, Tomorrow Other:  _________ Dose:  93231 Units Take 50,000 Units by mouth. Refills:  0 Where to Get Your Medications Information on where to get these meds will be given to you by the nurse or doctor. ! Ask your nurse or doctor about these medications  
  ciprofloxacin HCl 500 mg tablet  
 oxyCODONE-acetaminophen 5-325 mg per tablet  
 tamsulosin 0.4 mg capsule Discharge Instructions Cystoscopy: What to Expect at [facility] Your Recovery A cystoscopy is a procedure that lets a doctor look inside of the bladder and the urethra. The urethra is the tube that carries urine from the bladder to outside the body. The doctor uses a thin, lighted tube called a cystoscope to look for kidney or bladder stones, tumors, bleeding, or infection. After the cystoscopy, your urethra may be sore at first, and it may burn when you urinate for the first few days after the procedure. You may feel the need to urinate more often, and your urine may be pink. These symptoms should get better in 1 or 2 days. You will probably be able to go back to work or most of your usual activities in 1 or 2 days. This care sheet gives you a general idea about how long it will take for you to recover. But each person recovers at a different pace. Follow the steps below to get better as quickly as possible. How can you care for yourself at home? Activity · Rest when you feel tired. Getting enough sleep will help you recover. · Try to walk each day. Start by walking a little more than you did the day before. Bit by bit, increase the amount you walk. Walking boosts blood flow and helps prevent pneumonia and constipation. · Avoid strenuous activities, such as bicycle riding, jogging, weight lifting, or aerobic exercise, until your doctor says it is okay. · Ask your doctor when you can drive again. · Most people are able to return to work within 1 or 2 days after the procedure. · You may shower and take baths as usual. 
· Ask your doctor when it is okay for you to have sex. Diet · You can eat your normal diet. If your stomach is upset, try bland, low-fat foods like plain rice, broiled chicken, toast, and yogurt. · Drink plenty of fluids (unless your doctor tells you not to). Medicines · Take pain medicines exactly as directed. ¨ If the doctor gave you a prescription medicine for pain, take it as prescribed. ¨ If you are not taking a prescription pain medicine, ask your doctor if you can take an over-the-counter medicine. · If you think your pain medicine is making you sick to your stomach: 
¨ Take your medicine after meals (unless your doctor has told you not to). ¨ Ask your doctor for a different pain medicine. · If your doctor prescribed antibiotics, take them as directed. Do not stop taking them just because you feel better. You need to take the full course of antibiotics. Follow-up care is a key part of your treatment and safety. Be sure to make and go to all appointments, and call your doctor if you are having problems. It's also a good idea to know your test results and keep a list of the medicines you take. When should you call for help? Call 911 anytime you think you may need emergency care. For example, call if: 
· You passed out (lost consciousness). · You have severe trouble breathing. · You have sudden chest pain and shortness of breath, or you cough up blood. · You have severe belly pain. Call your doctor now or seek immediate medical care if: 
· You are sick to your stomach or cannot keep fluids down. · Your urine is still red or you see blood clots after you have urinated several times. · You have trouble passing urine or stool, especially if you have pain or swelling in your lower belly. · You have signs of a blood clot, such as: 
¨ Pain in your calf, back of the knee, thigh, or groin. ¨ Redness and swelling in your leg or groin. · You develop a fever or severe chills. · You have pain in your back just below your rib cage. This is called flank pain. Watch closely for changes in your health, and be sure to contact your doctor if: 
· You have pain or burning when you urinate. A burning feeling is normal for a day or two after the test, but call if it does not get better. · You have a frequent urge to urinate but can pass only small amounts of urine. · Your urine is pink, red, or cloudy, or smells bad. It is normal for the urine to have a pinkish color for a few days after the test, but call if it does not get better. Where can you learn more? Go to http://joaquin-chanel.info/. Enter N182 in the search box to learn more about \"Cystoscopy: What to Expect at Home. \" Current as of: August 12, 2016 Content Version: 11.1 © 8223-6048 [employer] Care instructions adapted under license by [employer] (which disclaims liability or warranty for this information). If you have questions about a medical condition or this instruction, always ask your healthcare professional. [place] [address] any warranty or liability for your use of this information. Laser Lithotripsy: What to Expect at [facility] Your Recovery Laser lithotripsy is a way to treat kidney stones. This treatment uses a laser to break kidney stones into tiny pieces. For several hours after the procedure you may have a burning feeling when you urinate. You may feel the urge to go even if you don't need to. This feeling should go away within a day. Drinking a lot of water can help. Your doctor also may advise you to take medicine that numbs the burning. This medicine is called phenazopyridine. It is available by prescription and over the counter. Brand names include Pyridium and Uristat. Your doctor may prescribe an antibiotic. This will help prevent an infection. You may have some blood in your urine for 2 or 3 days. Your doctor may have placed a small tube inside one of your ureters. Ureters are the tubes that connect the kidneys to the bladder. The small tube the doctor may have placed is called a stent. It may help the stone fragments pass through your body. Your doctor may remove the stent in a few weeks. Most stone fragments that are not removed pass out of the body within 24 hours. But sometimes it can take many weeks. If you have a large stone, you may need to come back for more treatments. This care sheet gives you a general idea about how long it will take for you to recover. But each person recovers at a different pace. Follow the steps below to feel better as quickly as possible. How can you care for yourself at home? Activity · Rest as much as you need to after you go home. · You may do your regular activities. But avoid hard exercise or sports for about a week or until there is no blood in your urine. Diet · You can eat your normal diet after lithotripsy. · Continue to drink plenty of fluids, enough so that your urine is light yellow or clear like water. If you have kidney, heart, or liver disease and have to limit fluids, talk with your doctor before you increase the amount of fluids you drink. Medicines · Your doctor will tell you if and when you can restart your medicines. He or she will also give you instructions about taking any new medicines. · If you take blood thinners, such as warfarin (Coumadin), clopidogrel (Plavix), or aspirin, be sure to talk to your doctor. He or she will tell you if and when to start taking those medicines again. Make sure that you understand exactly what your doctor wants you to do. · If you take medicine to stop the burning when you urinate, take it exactly as recommended. Call your doctor if you think you are having a problem with your medicine. This medicine may color your urine orange or red. This is normal. You will get more details on the specific medicine your doctor recommends. · If your doctor prescribed antibiotics, take them as directed. Do not stop taking them just because you feel better. You need to take the full course of antibiotics. · Be safe with medicines. Read and follow all instructions on the label. ¨ If the doctor gave you a prescription medicine for pain, take it as prescribed. ¨ If you are not taking a prescription pain medicine, ask your doctor if you can take acetaminophen (Tylenol). Do not take ibuprofen (Advil, Motrin) or naproxen (Aleve) or similar medicines unless your doctor tells you to. ¨ Do not take two or more pain medicines at the same time unless the doctor told you to. Many pain medicines have acetaminophen, which is Tylenol. Too much acetaminophen (Tylenol) can be harmful. Heat · Take a warm bath. This may soothe the burning. Other instructions · Urinate through the strainer the doctor gives you. Save any stone pieces, including those that look like sand or gravel. Take these to your doctor. This will help your doctor find the cause of your stones. Follow-up care is a key part of your treatment and safety. Be sure to make and go to all appointments, and call your doctor if you are having problems. It's also a good idea to know your test results and keep a list of the medicines you take. When should you call for help? Call your doctor now or seek immediate medical care if: · You have severe pain that does not get better after you take pain medicine. · You have severe pain when you urinate. · You have a fever over 100°F. 
· You are not able to urinate within 6 to 8 hours after the procedure. · Your urine is still bright red 48 hours after the procedure. Watch closely for any changes in your health, and be sure to contact your doctor if: 
· You have pain or burning when you urinate. A burning sensation is normal for a day or two after the test, but call if it does not get better. · You have a frequent urge to urinate but can pass only small amounts of urine. · Your urine is pink or cloudy or smells bad. It is normal for the urine to have a pinkish color for 2 or 3 days after the test, but call if it does not get better. · You do not get better as expected. Where can you learn more? Go to http://joaquin-chanel.info/. Enter Q239 in the search box to learn more about \"Laser Lithotripsy: What to Expect at Home. \" Current as of: November 20, 2015 Content Version: 11.1 © 6764-4738 [employer]. Care instructions adapted under license by [employer] (which disclaims liability or warranty for this information). If you have questions about a medical condition or this instruction, always ask your healthcare professional. [address] any warranty or liability for your use of this information. DISCHARGE SUMMARY from Nurse The following personal items are in your possession at time of discharge: 
 
Dental Appliances: None Visual Aid: Glasses Jewelry: None Clothing: Pants, Shirt, Undergarments, Socks, Footwear Other Valuables: None PATIENT INSTRUCTIONS: 
 
 
F-face looks uneven A-arms unable to move or move unevenly S-speech slurred or non-existent T-time-call 911 as soon as signs and symptoms begin-DO NOT go  
 Back to bed or wait to see if you get better-TIME IS BRAIN. Warning Signs of HEART ATTACK Call 911 if you have these symptoms: 
? Chest discomfort. Most heart attacks involve discomfort in the center of the chest that lasts more than a few minutes, or that goes away and comes back. It can feel like uncomfortable pressure, squeezing, fullness, or pain. ? Discomfort in other areas of the upper body. Symptoms can include pain or discomfort in one or both arms, the back, neck, jaw, or stomach. ? Shortness of breath with or without chest discomfort. ? Other signs may include breaking out in a cold sweat, nausea, or lightheadedness. Don't wait more than five minutes to call [address]! Fast action can save your life. Calling 911 is almost always the fastest way to get lifesaving treatment. Emergency Medical Services staff can begin treatment when they arrive  up to an hour sooner than if someone gets to the hospital by car. The discharge information has been reviewed with the patient and daughter. The patient and daughter verbalized understanding. Discharge medications reviewed with the patient and daughter and appropriate educational materials and side effects teaching were provided. Ciprofloxacin (By mouth) Ciprofloxacin ([id number]) Treats infections. This medicine is a quinolone antibiotic. Brand Name(s):Cipro There may be other brand names for this medicine. When This Medicine Should Not Be Used: This medicine is not right for everyone. Do not use if you had an allergic reaction to ciprofloxacin or similar medicines. How to Use This Medicine:  
Liquid, Tablet, Long Acting Tablet · Take this medicine as directed, and take it at the same time each day. · You may take this medicine with or without food. Do not take this medicine with only a source of calcium, such as milk, yogurt, or juice that contains added calcium.  You may have foods or drinks that contain calcium as part of a larger meal. 
· Swallow the tablet whole. Do not break, crush, or chew it. · Oral liquid: Shake for at least 15 seconds just before each use. The liquid has small beads floating in it. Do not chew the beads when you drink the liquid. Measure the oral liquid medicine with a marked measuring spoon, oral syringe, or medicine cup. · Drink extra fluids so you will urinate more often and help prevent kidney problems. · Take all of the medicine in your prescription to clear up your infection, even if you feel better after the first few doses. · This medicine should come with a Medication Guide. Ask your pharmacist for a copy if you do not have one. · Missed dose: Take a dose as soon as you remember. If it is almost time for your next dose, wait until then and take a regular dose. Do not take extra medicine to make up for a missed dose. · Store the medicine in a closed container at room temperature, away from heat, moisture, and direct light. Throw away any leftover liquid medicine after 14 days. Drugs and Foods to Avoid: Ask your doctor or pharmacist before using any other medicine, including over-the-counter medicines, vitamins, and herbal products. · Do not use this medicine together with tizanidine. · Some medicines and foods can affect how ciprofloxacin works. Tell your doctor if you are using theophylline or a steroid medicine (such as hydrocortisone, methylprednisolone, prednisone).  
· Tell your doctor if you are using clozapine, cyclosporine, duloxetine, lidocaine, methotrexate, olanzapine, pentoxifylline, phenytoin, probenecid, ropinirole, sildenafil, a blood thinner (such as warfarin), a diabetes medicine (such as glyburide), medicine for depression, medicine for mental illness, other medicine to treat an infection, NSAID pain medicine (such as aspirin, diclofenac, ibuprofen, naproxen, celecoxib), or medicine for heart rhythm problems (such as quinidine, procainamide, amiodarone, sotalol). · Some minerals and medicines can keep your body from absorbing this medicine. You may need to take ciprofloxacin at least 2 hours before or 6 hours after you take these medicines. These include magnesium, aluminum, calcium, zinc, iron, lanthanum, sevelamer, sucralfate, and didanosine. Ask your pharmacist for more information. · This medicine slows the digestion of caffeine, so it might affect you for longer than normal. 
Warnings While Using This Medicine: · Tell your doctor if you are pregnant or breastfeeding, or if you have kidney disease, liver disease, diabetes, heart disease, myasthenia gravis, or a history of heart rhythm problems (such as prolonged QT interval), seizures, or stroke. Tell your doctor if you have ever had tendon or joint problems, including rheumatoid arthritis, or if you have received a transplant. · This medicine may cause the following problems: 
¨ Tendinitis and tendon rupture (may happen after treatment ends) ¨ Liver damage ¨ Nerve damage in the arms or legs ¨ Severe diarrhea 
¨ Heart rhythm changes · This medicine may make you dizzy, drowsy, or lightheaded. Do not drive or do anything that could be dangerous until you know how this medicine affects you. · This medicine can cause diarrhea. Call your doctor if the diarrhea becomes severe, does not stop, or is bloody. Do not take any medicine to stop diarrhea until you have talked to your doctor. Diarrhea can occur 2 months or more after you stop taking this medicine. · This medicine may make your skin more sensitive to sunlight. Wear sunscreen. Do not use sunlamps or tanning beds. · Call your doctor if your symptoms do not improve or if they get worse. · Keep all medicine out of the reach of children. Never share your medicine with anyone. Possible Side Effects While Using This Medicine:  
Call your doctor right away if you notice any of these side effects: · Allergic reaction: Itching or hives, swelling in your face or hands, swelling or tingling in your mouth or throat, chest tightness, trouble breathing · Blistering, peeling, or red skin rash · Diarrhea that may contain blood · Fainting, dizziness, or lightheadedness · Fast, slow, or uneven heartbeat · Numbness, tingling, weakness, or burning pain in your hands, arms, legs, or feet · Pain, stiffness, swelling, or bruises around your ankle, leg, shoulder, or other joint · Seizures, severe headache, unusual thoughts or behaviors, trouble sleeping, confusion · Unusual bleeding, bruising, or weakness · Yellow skin or eyes If you notice other side effects that you think are caused by this medicine, tell your doctor. Call your doctor for medical advice about side effects. You may report side effects to FDA at 7-145-FDA-1978 © 2016 [address] is for End User's use only and may not be sold, redistributed or otherwise used for commercial purposes. The above information is an  only. It is not intended as medical advice for individual conditions or treatments. Talk to your doctor, nurse or pharmacist before following any medical regimen to see if it is safe and effective for you. Discharge Orders None Introducing [facility]! [employer] introduces [employer]plle patient portal. Now you can access parts of your medical record, email your doctor's office, and request medication refills online. 1. In your internet browser, go to [email or web address] 2. Click on the First Time User? Click Here link in the Sign In box. You will see the New Member Sign Up page. 3. Enter your Purplle Access Code exactly as it appears below. You will not need to use this code after youve completed the sign-up process. If you do not sign up before the expiration date, you must request a new code. · Purplle Access Code: [email or web address] Expires: 5/16/2017  3:55 PM 
 
4. Enter the last four digits of your Social Security Number (xxxx) and Date of Birth (mm/dd/yyyy) as indicated and click Submit. You will be taken to the next sign-up page. 5. Create a OpGen ID. This will be your OpGen login ID and cannot be changed, so think of one that is secure and easy to remember. 6. Create a OpGen password. You can change your password at any time. 7. Enter your Password Reset Question and Answer. This can be used at a later time if you forget your password. 8. Enter your e-mail address. You will receive e-mail notification when new information is available in [address] 9. Click Sign Up. You can now view and download portions of your medical record. 10. Click the Download Summary menu link to download a portable copy of your medical information. If you have questions, please visit the Frequently Asked Questions section of the OpGen website. Remember, OpGen is NOT to be used for urgent needs. For medical emergencies, dial 911. Now available from your iPhone and Android! General Information Please provide this summary of care documentation to your next provider. Patient Signature:  ____________________________________________________________ Date:  ____________________________________________________________  
  
Cassie Sleight Provider Signature:  ____________________________________________________________ Date:  ____________________________________________________________

## 2017-02-28 LAB
AMM URATE MFR STONE: 10 %
CA PHOS MFR STONE: 5 %
CALCIUM OXALATE DIHYDRATE MFR STONE IR: 15 %
COLOR STONE: NORMAL
COM MFR STONE: 55 %
COMMENT, 519994: NORMAL
COMPOSITION, 120440: NORMAL
DISCLAIMER, STO32L: NORMAL
NIDUS STONE QL: NORMAL
SIZE STONE: NORMAL MM
SURFACE CRYSTALS, 120439: NORMAL
URATE MFR STONE: 15 %
WT STONE: 124 MG

## 2022-03-11 ENCOUNTER — APPOINTMENT (OUTPATIENT)
Dept: MRI IMAGING | Age: 67
DRG: 871 | End: 2022-03-11
Attending: INTERNAL MEDICINE
Payer: MEDICARE

## 2022-03-11 ENCOUNTER — APPOINTMENT (OUTPATIENT)
Dept: CT IMAGING | Age: 67
DRG: 871 | End: 2022-03-11
Attending: EMERGENCY MEDICINE
Payer: MEDICARE

## 2022-03-11 ENCOUNTER — APPOINTMENT (OUTPATIENT)
Dept: ULTRASOUND IMAGING | Age: 67
DRG: 871 | End: 2022-03-11
Attending: INTERNAL MEDICINE
Payer: MEDICARE

## 2022-03-11 ENCOUNTER — HOSPITAL ENCOUNTER (INPATIENT)
Age: 67
LOS: 4 days | Discharge: HOME HEALTH | DRG: 871 | End: 2022-03-15
Attending: EMERGENCY MEDICINE | Admitting: INTERNAL MEDICINE
Payer: MEDICARE

## 2022-03-11 ENCOUNTER — APPOINTMENT (OUTPATIENT)
Dept: GENERAL RADIOLOGY | Age: 67
DRG: 871 | End: 2022-03-11
Attending: EMERGENCY MEDICINE
Payer: MEDICARE

## 2022-03-11 DIAGNOSIS — M54.50 LOW BACK PAIN, UNSPECIFIED BACK PAIN LATERALITY, UNSPECIFIED CHRONICITY, UNSPECIFIED WHETHER SCIATICA PRESENT: ICD-10-CM

## 2022-03-11 DIAGNOSIS — A41.9 ACUTE SEPSIS (HCC): Primary | ICD-10-CM

## 2022-03-11 PROBLEM — J15.9 COMMUNITY ACQUIRED BACTERIAL PNEUMONIA: Status: ACTIVE | Noted: 2022-03-11
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
## 2022-03-11 LAB
ALBUMIN SERPL-MCNC: 2.7 G/DL (ref 3.4–5)
ALBUMIN/GLOB SERPL: 0.6 {RATIO} (ref 0.8–1.7)
ALP SERPL-CCNC: 311 U/L (ref 45–117)
ALT SERPL-CCNC: 16 U/L (ref 13–56)
ANION GAP SERPL CALC-SCNC: 7 MMOL/L (ref 3–18)
APPEARANCE UR: CLEAR
AST SERPL-CCNC: 33 U/L (ref 10–38)
B PERT DNA SPEC QL NAA+PROBE: NOT DETECTED
BACTERIA URNS QL MICRO: ABNORMAL /HPF
BASOPHILS # BLD: 0 K/UL (ref 0–0.1)
BASOPHILS NFR BLD: 0 % (ref 0–2)
BILIRUB SERPL-MCNC: 0.3 MG/DL (ref 0.2–1)
BILIRUB UR QL: NEGATIVE
BORDETELLA PARAPERTUSSIS PCR, BORPAR: NOT DETECTED
BUN SERPL-MCNC: 17 MG/DL (ref 7–18)
BUN/CREAT SERPL: 16 (ref 12–20)
C PNEUM DNA SPEC QL NAA+PROBE: NOT DETECTED
CALCIUM SERPL-MCNC: 9.1 MG/DL (ref 8.5–10.1)
CHLORIDE SERPL-SCNC: 111 MMOL/L (ref 100–111)
CK SERPL-CCNC: 45 U/L (ref 26–192)
CO2 SERPL-SCNC: 25 MMOL/L (ref 21–32)
COLOR UR: YELLOW
CREAT SERPL-MCNC: 1.07 MG/DL (ref 0.6–1.3)
DIFFERENTIAL METHOD BLD: ABNORMAL
EOSINOPHIL # BLD: 0 K/UL (ref 0–0.4)
EOSINOPHIL NFR BLD: 0 % (ref 0–5)
EPITH CASTS URNS QL MICRO: ABNORMAL /LPF (ref 0–5)
ERYTHROCYTE [DISTWIDTH] IN BLOOD BY AUTOMATED COUNT: 16 % (ref 11.6–14.5)
FLUAV H1 2009 PAND RNA SPEC QL NAA+PROBE: NOT DETECTED
FLUAV H1 RNA SPEC QL NAA+PROBE: NOT DETECTED
FLUAV H3 RNA SPEC QL NAA+PROBE: NOT DETECTED
FLUAV SUBTYP SPEC NAA+PROBE: NOT DETECTED
FLUBV RNA SPEC QL NAA+PROBE: NOT DETECTED
GLOBULIN SER CALC-MCNC: 4.2 G/DL (ref 2–4)
GLUCOSE SERPL-MCNC: 98 MG/DL (ref 74–99)
GLUCOSE UR STRIP.AUTO-MCNC: NEGATIVE MG/DL
HADV DNA SPEC QL NAA+PROBE: NOT DETECTED
HCOV 229E RNA SPEC QL NAA+PROBE: NOT DETECTED
HCOV HKU1 RNA SPEC QL NAA+PROBE: NOT DETECTED
HCOV NL63 RNA SPEC QL NAA+PROBE: NOT DETECTED
HCOV OC43 RNA SPEC QL NAA+PROBE: NOT DETECTED
HCT VFR BLD AUTO: 36.3 % (ref 35–45)
HGB BLD-MCNC: 11.3 G/DL (ref 12–16)
HGB UR QL STRIP: NEGATIVE
HMPV RNA SPEC QL NAA+PROBE: NOT DETECTED
HPIV1 RNA SPEC QL NAA+PROBE: NOT DETECTED
HPIV2 RNA SPEC QL NAA+PROBE: NOT DETECTED
HPIV3 RNA SPEC QL NAA+PROBE: NOT DETECTED
HPIV4 RNA SPEC QL NAA+PROBE: NOT DETECTED
IMM GRANULOCYTES # BLD AUTO: 0 K/UL (ref 0–0.04)
IMM GRANULOCYTES NFR BLD AUTO: 0 % (ref 0–0.5)
KETONES UR QL STRIP.AUTO: NEGATIVE MG/DL
LACTATE BLD-SCNC: 2.9 MMOL/L (ref 0.4–2)
LACTATE SERPL-SCNC: 1.8 MMOL/L (ref 0.4–2)
LEUKOCYTE ESTERASE UR QL STRIP.AUTO: NEGATIVE
LYMPHOCYTES # BLD: 0.3 K/UL (ref 0.9–3.6)
LYMPHOCYTES NFR BLD: 5 % (ref 21–52)
M PNEUMO DNA SPEC QL NAA+PROBE: NOT DETECTED
MCH RBC QN AUTO: 29.8 PG (ref 24–34)
MCHC RBC AUTO-ENTMCNC: 31.1 G/DL (ref 31–37)
MCV RBC AUTO: 95.8 FL (ref 78–100)
METAMYELOCYTES NFR BLD MANUAL: 2 %
MONOCYTES # BLD: 0 K/UL (ref 0.05–1.2)
MONOCYTES NFR BLD: 0 % (ref 3–10)
NEUTS BAND NFR BLD MANUAL: 14 %
NEUTS SEG # BLD: 6 K/UL (ref 1.8–8)
NEUTS SEG NFR BLD: 79 % (ref 40–73)
NITRITE UR QL STRIP.AUTO: NEGATIVE
NRBC # BLD: 0.04 K/UL (ref 0–0.01)
NRBC BLD-RTO: 0.6 PER 100 WBC
PH UR STRIP: 6.5 [PH] (ref 5–8)
PLATELET # BLD AUTO: 262 K/UL (ref 135–420)
PLATELET COMMENTS,PCOM: ABNORMAL
PMV BLD AUTO: 9.8 FL (ref 9.2–11.8)
POTASSIUM SERPL-SCNC: 4.1 MMOL/L (ref 3.5–5.5)
PROCALCITONIN SERPL-MCNC: 23.43 NG/ML
PROT SERPL-MCNC: 6.9 G/DL (ref 6.4–8.2)
PROT UR STRIP-MCNC: 100 MG/DL
RBC # BLD AUTO: 3.79 M/UL (ref 4.2–5.3)
RBC #/AREA URNS HPF: NEGATIVE /HPF (ref 0–5)
RBC MORPH BLD: ABNORMAL
RSV RNA SPEC QL NAA+PROBE: NOT DETECTED
RV+EV RNA SPEC QL NAA+PROBE: NOT DETECTED
SARS-COV-2 PCR, COVPCR: NOT DETECTED
SODIUM SERPL-SCNC: 143 MMOL/L (ref 136–145)
SP GR UR REFRACTOMETRY: 1.01 (ref 1–1.03)
UROBILINOGEN UR QL STRIP.AUTO: 1 EU/DL (ref 0.2–1)
WBC # BLD AUTO: 6.4 K/UL (ref 4.6–13.2)
WBC URNS QL MICRO: ABNORMAL /HPF (ref 0–4)

## 2022-03-11 PROCEDURE — 65660000000 HC RM CCU STEPDOWN

## 2022-03-11 PROCEDURE — 71250 CT THORAX DX C-: CPT

## 2022-03-11 PROCEDURE — 70551 MRI BRAIN STEM W/O DYE: CPT

## 2022-03-11 PROCEDURE — 83605 ASSAY OF LACTIC ACID: CPT

## 2022-03-11 PROCEDURE — 84145 PROCALCITONIN (PCT): CPT

## 2022-03-11 PROCEDURE — 87086 URINE CULTURE/COLONY COUNT: CPT

## 2022-03-11 PROCEDURE — 87077 CULTURE AEROBIC IDENTIFY: CPT

## 2022-03-11 PROCEDURE — 2709999900 HC NON-CHARGEABLE SUPPLY

## 2022-03-11 PROCEDURE — 87040 BLOOD CULTURE FOR BACTERIA: CPT

## 2022-03-11 PROCEDURE — 82550 ASSAY OF CK (CPK): CPT

## 2022-03-11 PROCEDURE — 80053 COMPREHEN METABOLIC PANEL: CPT

## 2022-03-11 PROCEDURE — 74011250637 HC RX REV CODE- 250/637: Performed by: INTERNAL MEDICINE

## 2022-03-11 PROCEDURE — 71045 X-RAY EXAM CHEST 1 VIEW: CPT

## 2022-03-11 PROCEDURE — 74011000250 HC RX REV CODE- 250: Performed by: EMERGENCY MEDICINE

## 2022-03-11 PROCEDURE — 99221 1ST HOSP IP/OBS SF/LOW 40: CPT | Performed by: INTERNAL MEDICINE

## 2022-03-11 PROCEDURE — 36415 COLL VENOUS BLD VENIPUNCTURE: CPT

## 2022-03-11 PROCEDURE — 77030038269 HC DRN EXT URIN PURWCK BARD -A

## 2022-03-11 PROCEDURE — 72141 MRI NECK SPINE W/O DYE: CPT

## 2022-03-11 PROCEDURE — 87186 SC STD MICRODIL/AGAR DIL: CPT

## 2022-03-11 PROCEDURE — 96365 THER/PROPH/DIAG IV INF INIT: CPT

## 2022-03-11 PROCEDURE — 74011250636 HC RX REV CODE- 250/636: Performed by: EMERGENCY MEDICINE

## 2022-03-11 PROCEDURE — 93005 ELECTROCARDIOGRAM TRACING: CPT

## 2022-03-11 PROCEDURE — 85025 COMPLETE CBC W/AUTO DIFF WBC: CPT

## 2022-03-11 PROCEDURE — 70450 CT HEAD/BRAIN W/O DYE: CPT

## 2022-03-11 PROCEDURE — 81001 URINALYSIS AUTO W/SCOPE: CPT

## 2022-03-11 PROCEDURE — 76705 ECHO EXAM OF ABDOMEN: CPT

## 2022-03-11 PROCEDURE — 0202U NFCT DS 22 TRGT SARS-COV-2: CPT

## 2022-03-11 PROCEDURE — 74011000250 HC RX REV CODE- 250: Performed by: INTERNAL MEDICINE

## 2022-03-11 PROCEDURE — 99285 EMERGENCY DEPT VISIT HI MDM: CPT

## 2022-03-11 RX ORDER — TAMSULOSIN HYDROCHLORIDE 0.4 MG/1
0.4 CAPSULE ORAL DAILY
Status: DISCONTINUED | OUTPATIENT
Start: 2022-03-12 | End: 2022-03-11

## 2022-03-11 RX ORDER — ACETAMINOPHEN 650 MG/1
650 SUPPOSITORY RECTAL
Status: DISCONTINUED | OUTPATIENT
Start: 2022-03-11 | End: 2022-03-15 | Stop reason: HOSPADM

## 2022-03-11 RX ORDER — OXYCODONE AND ACETAMINOPHEN 5; 325 MG/1; MG/1
1-2 TABLET ORAL
Status: DISCONTINUED | OUTPATIENT
Start: 2022-03-11 | End: 2022-03-11

## 2022-03-11 RX ORDER — METOPROLOL SUCCINATE 100 MG/1
100 TABLET, EXTENDED RELEASE ORAL DAILY
Status: DISCONTINUED | OUTPATIENT
Start: 2022-03-12 | End: 2022-03-15 | Stop reason: HOSPADM

## 2022-03-11 RX ORDER — LEVOFLOXACIN 5 MG/ML
750 INJECTION, SOLUTION INTRAVENOUS EVERY 24 HOURS
Status: DISCONTINUED | OUTPATIENT
Start: 2022-03-11 | End: 2022-03-11

## 2022-03-11 RX ORDER — SODIUM CHLORIDE 0.9 % (FLUSH) 0.9 %
5-40 SYRINGE (ML) INJECTION AS NEEDED
Status: DISCONTINUED | OUTPATIENT
Start: 2022-03-11 | End: 2022-03-15 | Stop reason: HOSPADM

## 2022-03-11 RX ORDER — ASPIRIN 325 MG
325 TABLET ORAL
Status: DISCONTINUED | OUTPATIENT
Start: 2022-03-11 | End: 2022-03-15 | Stop reason: HOSPADM

## 2022-03-11 RX ORDER — BACLOFEN 10 MG/1
10 TABLET ORAL 3 TIMES DAILY
Status: DISCONTINUED | OUTPATIENT
Start: 2022-03-11 | End: 2022-03-11

## 2022-03-11 RX ORDER — THERA TABS 400 MCG
1 TAB ORAL DAILY
Status: DISCONTINUED | OUTPATIENT
Start: 2022-03-12 | End: 2022-03-15 | Stop reason: HOSPADM

## 2022-03-11 RX ORDER — LISINOPRIL 20 MG/1
40 TABLET ORAL DAILY
Status: DISCONTINUED | OUTPATIENT
Start: 2022-03-12 | End: 2022-03-15 | Stop reason: HOSPADM

## 2022-03-11 RX ORDER — PROMETHAZINE HYDROCHLORIDE 12.5 MG/1
12.5 TABLET ORAL
Status: DISCONTINUED | OUTPATIENT
Start: 2022-03-11 | End: 2022-03-15 | Stop reason: HOSPADM

## 2022-03-11 RX ORDER — CLONAZEPAM 0.5 MG/1
0.5 TABLET ORAL
Status: DISCONTINUED | OUTPATIENT
Start: 2022-03-11 | End: 2022-03-15 | Stop reason: HOSPADM

## 2022-03-11 RX ORDER — CYCLOBENZAPRINE HCL 10 MG
10 TABLET ORAL
Status: DISCONTINUED | OUTPATIENT
Start: 2022-03-11 | End: 2022-03-15 | Stop reason: HOSPADM

## 2022-03-11 RX ORDER — ATORVASTATIN CALCIUM 80 MG/1
80 TABLET, FILM COATED ORAL
COMMUNITY

## 2022-03-11 RX ORDER — SODIUM CHLORIDE 0.9 % (FLUSH) 0.9 %
5-40 SYRINGE (ML) INJECTION EVERY 8 HOURS
Status: DISCONTINUED | OUTPATIENT
Start: 2022-03-11 | End: 2022-03-15 | Stop reason: HOSPADM

## 2022-03-11 RX ORDER — SODIUM CHLORIDE 0.9 % (FLUSH) 0.9 %
5-10 SYRINGE (ML) INJECTION AS NEEDED
Status: DISCONTINUED | OUTPATIENT
Start: 2022-03-11 | End: 2022-03-15 | Stop reason: HOSPADM

## 2022-03-11 RX ORDER — ONDANSETRON 2 MG/ML
4 INJECTION INTRAMUSCULAR; INTRAVENOUS
Status: DISCONTINUED | OUTPATIENT
Start: 2022-03-11 | End: 2022-03-15 | Stop reason: HOSPADM

## 2022-03-11 RX ORDER — ERGOCALCIFEROL 1.25 MG/1
50000 CAPSULE ORAL DAILY
Status: DISCONTINUED | OUTPATIENT
Start: 2022-03-12 | End: 2022-03-11

## 2022-03-11 RX ORDER — ENOXAPARIN SODIUM 100 MG/ML
40 INJECTION SUBCUTANEOUS DAILY
Status: DISCONTINUED | OUTPATIENT
Start: 2022-03-12 | End: 2022-03-15 | Stop reason: HOSPADM

## 2022-03-11 RX ORDER — ACETAMINOPHEN 325 MG/1
650 TABLET ORAL
Status: DISCONTINUED | OUTPATIENT
Start: 2022-03-11 | End: 2022-03-15 | Stop reason: HOSPADM

## 2022-03-11 RX ORDER — HYDROCHLOROTHIAZIDE 25 MG/1
25 TABLET ORAL DAILY
Status: DISCONTINUED | OUTPATIENT
Start: 2022-03-12 | End: 2022-03-11

## 2022-03-11 RX ORDER — METOPROLOL SUCCINATE 100 MG/1
100 TABLET, EXTENDED RELEASE ORAL DAILY
COMMUNITY

## 2022-03-11 RX ORDER — POLYETHYLENE GLYCOL 3350 17 G/17G
17 POWDER, FOR SOLUTION ORAL DAILY PRN
Status: DISCONTINUED | OUTPATIENT
Start: 2022-03-11 | End: 2022-03-15 | Stop reason: HOSPADM

## 2022-03-11 RX ORDER — LEVOFLOXACIN 5 MG/ML
750 INJECTION, SOLUTION INTRAVENOUS
Status: DISCONTINUED | OUTPATIENT
Start: 2022-03-13 | End: 2022-03-13

## 2022-03-11 RX ORDER — ATORVASTATIN CALCIUM 40 MG/1
80 TABLET, FILM COATED ORAL
Status: DISCONTINUED | OUTPATIENT
Start: 2022-03-11 | End: 2022-03-15 | Stop reason: HOSPADM

## 2022-03-11 RX ADMIN — CEFEPIME HYDROCHLORIDE 2 G: 2 INJECTION, POWDER, FOR SOLUTION INTRAVENOUS at 07:22

## 2022-03-11 RX ADMIN — SODIUM CHLORIDE, PRESERVATIVE FREE 10 ML: 5 INJECTION INTRAVENOUS at 18:29

## 2022-03-11 RX ADMIN — SODIUM CHLORIDE 482 ML: 9 INJECTION, SOLUTION INTRAVENOUS at 08:56

## 2022-03-11 RX ADMIN — VANCOMYCIN HYDROCHLORIDE 1000 MG: 1 INJECTION, POWDER, LYOPHILIZED, FOR SOLUTION INTRAVENOUS at 09:21

## 2022-03-11 RX ADMIN — CLONAZEPAM 0.5 MG: 0.5 TABLET ORAL at 18:32

## 2022-03-11 RX ADMIN — LEVOFLOXACIN 750 MG: 5 INJECTION, SOLUTION INTRAVENOUS at 07:33

## 2022-03-11 RX ADMIN — CYCLOBENZAPRINE 10 MG: 10 TABLET, FILM COATED ORAL at 18:32

## 2022-03-11 RX ADMIN — SODIUM CHLORIDE 1000 ML: 9 INJECTION, SOLUTION INTRAVENOUS at 07:21

## 2022-03-11 NOTE — CONSULTS
74year old female with past medical history of hypertension, history of multiple sclerosis diagnosed in 2014 admitted for falls, patient is poor historian with memory issues, reported that she is not eating well confused and brought to hospital, suspected sepsis and started on antibiotics, neurology consulted for multiple falls, patient admits taking injections for MS in the past but because of elevated liver function it was discontinued, admit taking aspirin for her stroke as well, patient feel loosing control in her legs and using walker at home if needed, denies headache but admit paresthesias in her feet and muscle cramps that she had when dx MS, very slow to answer questions. Social History     Socioeconomic History    Marital status:      Spouse name: Not on file    Number of children: Not on file    Years of education: Not on file    Highest education level: Not on file   Occupational History    Not on file   Tobacco Use    Smoking status: Former Smoker    Smokeless tobacco: Never Used   Substance and Sexual Activity    Alcohol use: Yes     Comment: socially    Drug use: No    Sexual activity: Not on file   Other Topics Concern    Not on file   Social History Narrative    Not on file     Social Determinants of Health     Financial Resource Strain:     Difficulty of Paying Living Expenses: Not on file   Food Insecurity:     Worried About [address] in the Last Year: Not on file    [address] in the Last Year: Not on file   Transportation Needs:     Lack of Transportation (Medical): Not on file    Lack of Transportation (Non-Medical):  Not on file   Physical Activity:     Days of Exercise per Week: Not on file    Minutes of Exercise per Session: Not on file   Stress:     Feeling of Stress : Not on file   Social Connections:     Frequency of Communication with Friends and Family: Not on file    Frequency of Social Gatherings with Friends and Family: Not on file   [facility] Attends Yazidi Services: Not on file    Active Member of Clubs or Organizations: Not on file    Attends Club or Organization Meetings: Not on file    Marital Status: Not on file   Intimate Partner Violence:     Fear of Current or Ex-Partner: Not on file    Emotionally Abused: Not on file    Physically Abused: Not on file    Sexually Abused: Not on file   Housing Stability:     Unable to Pay for Housing in the Last Year: Not on file    Number of [place] in the Last Year: Not on file    Unstable Housing in the Last Year: Not on file       History reviewed. No pertinent family history.      Current Facility-Administered Medications   Medication Dose Route Frequency Provider Last Rate Last Admin    sodium chloride (NS) flush 5-10 mL  5-10 mL IntraVENous PRN MD Samir Berg [START ON 3/12/2022] vancomycin (VANCOCIN) 1,000 mg in 0.9% sodium chloride 250 mL (VIAL-MATE)  1,000 mg IntraVENous Q24H MD Samir Berg ON 3/13/2022] levoFLOXacin (LEVAQUIN) 750 mg in D5W IVPB  750 mg IntraVENous Q48H Ewa Heath MD        aspirin tablet 325 mg  325 mg Oral QHS Ewa Heath MD        clonazePAM (KlonoPIN) tablet 0.5 mg  0.5 mg Oral TID PRN Ewa Heath MD        cyclobenzaprine (FLEXERIL) tablet 10 mg  10 mg Oral Q8H PRN Ewa Heath MD        [START ON 3/12/2022] lisinopriL (PRINIVIL, ZESTRIL) tablet 40 mg  40 mg Oral DAILY MD Samir Berg ON 3/12/2022] therapeutic multivitamin [facility]) tablet 1 Tablet  1 Tablet Oral DAILY Ewa Heath MD        atorvastatin (LIPITOR) tablet 80 mg  80 mg Oral QHS Ewa Heath MD        sodium chloride (NS) flush 5-40 mL  5-40 mL IntraVENous Q8H Ewa Heath MD        sodium chloride (NS) flush 5-40 mL  5-40 mL IntraVENous PRN Ewa Heath MD        acetaminophen (TYLENOL) tablet 650 mg  650 mg Oral Q6H PRN Kristeen Hacking, MD        Or   [place] acetaminophen (TYLENOL) suppository 650 mg  650 mg Rectal Q6H PRN Carroll Boyce MD        polyethylene glycol (MIRALAX) packet 17 g  17 g Oral DAILY PRN Carroll Boyce MD        promethazine (PHENERGAN) tablet 12.5 mg  12.5 mg Oral Q6H PRN Carroll Boyce MD        Or    ondansetron [facility]) injection 4 mg  4 mg IntraVENous Q6H PRN Carroll Boyce MD       [facility] ON 3/12/2022] enoxaparin (LOVENOX) injection 40 mg  40 mg SubCUTAneous DAILY Carroll Boyce MD       [facility] ON 3/12/2022] metoprolol succinate (TOPROL-XL) tablet 100 mg  100 mg Oral DAILY Carroll Boyce MD           Past Medical History:   Diagnosis Date    Autoimmune disease ([facility] [address]     MS    Gross hematuria     Hypertension     Kidney stones     MS (congenital mitral stenosis)     Stroke (cerebrum) ([facility] [address]        Past Surgical History:   Procedure Laterality Date    HX UROLOGICAL  12/2016    stent placement       Allergies   Allergen Reactions    Codeine Shortness of Breath, Other (comments) and Rash    Interferon Beta-1a Other (comments)     ELEVATED LIVER FUNCTION TESTS    Penicillins Hives           PHYSICAL EXAMINATION:      VITAL SIGNS:    Visit Vitals  /66 (BP 1 Location: Right upper arm, BP Patient Position: At rest)   Pulse 86   Temp 99.8 °F (37.7 °C)   Resp 18   Ht 5' 5\" (1.651 m)   Wt 49.4 kg (109 lb)   SpO2 97%   BMI 18.14 kg/m²       GENERAL: The patient is well developed, well nourished, and in no apparent distress. EXTREMITIES: No clubbing, cyanosis, or edema is identified. Pulses 2+ and symmetrical.  Muscle tone is normal.  HEAD:   Ear, nose, and throat appear to be without trauma. The patient is normocephalic. NEUROLOGIC EXAMINATION  Mental status: Awake, alert, oriented x3, follows simple commands, limited speech, very slow to respond, very difficult to assess.   Speech and languge: very slow fluent, naming and repitition intact,   CN: VFF, EOMI, PERRLA, face sensation intact , no facial asymmetry, tongue midline  Motor: strength 5/5 in upper, lower 2-3/5, weaker over the left side may be poor effort, was able to walk to bath room with assistance. Sensory: intact to light touch throughout, proprioception intact  DTR: suppressed and equivocal  Gait: walk with assistance. 74year old female with past medical history of hypertension, history of multiple sclerosis diagnosed in 2014 admitted for falls, patient is poor historian with memory issues, reported that she is not eating well confused and brought to hospital, suspected sepsis and started on antibiotics, neurology consulted for multiple falls, patient admits taking injections for MS in the past but because of elevated liver function it was discontinued, admit taking aspirin for her stroke as well, patient feel loosing control in her legs and using walker at home if needed, denies headache but admit paresthesias in her feet and muscle cramps that she had when dx MS, very slow to answer questions. IMPRESSION:Recurrent falls, patient poor historian with poor effort,   Fall/Syncope? on telemetry for monitoring. HISTORY OF MS-  Recommend MRI BRAIN AND CERVICAL spine, rule out cervical disc disease contributing to her fall or demyelinating lesions. Check B12 and TSH. Fall precautions  PT and OT evaluation. STROKE-  Aspirin and statin  Maximize control of risk factors. Plan:As above, will follow up as need and after imaging. I spent 35 minutes with the patient in face-to-face consultation, of which greater than 50% was spent in counseling and coordination of care as described above. PLEASE NOTE:   This document has been produced using voice recognition software. Unrecognized errors in transcription may be present.

## 2022-03-11 NOTE — ED NOTES
Physician at bedside discussing admission with daughter and patient. They both verbalize understanding of need for admission.

## 2022-03-11 NOTE — REMOTE MONITORING
Attempted to contact primary RN in regards to the sepsis bundle.     Yashira Grewal MSN, [address] [facility]  [phone]

## 2022-03-11 NOTE — Clinical Note
Status[de-identified] INPATIENT [101]   Type of Bed: Telemetry [19]   Cardiac Monitoring Required?: Yes   Inpatient Hospitalization Certified Necessary for the Following Reasons: 3.  Patient receiving treatment that can only be provided in an inpatient setting (further clarification in H&P documentation)   Admitting Diagnosis: Acute sepsis [facility]) [[id number]]   Admitting Diagnosis: Fall [962450]   Admitting Diagnosis: Community acquired bacterial pneumonia [[id number]]   Admitting Physician: Brian Wang   Attending Physician: Brian Wang   Estimated Length of Stay: 2 Midnights   Discharge Plan[de-identified] Home with Office Follow-up

## 2022-03-11 NOTE — ED PROVIDER NOTES
EMERGENCY DEPARTMENT HISTORY AND PHYSICAL EXAM  This was created with voice recognition software and transcription errors may be present. 6:44 AM  Date: 3/11/2022  Patient Name: Trae Parmar    History of Presenting Illness     Chief Complaint:    History Provided By:     HPI: Trae Parmar is a 74 y.o. female female with past medical history of MS hematuria hypertension kidney stones and stroke who presents for concern of a fall. Apparently the patient has had increased confusion and decreased appetite over the past few days so family became concerned that she was having an infection. This morning the patient had a fall so her daughter called 911 the patient is awake and oriented x2 she knows her name she knows she is at a hospital but was not sure which one she does not know the year. She denies hitting her head she denies loss of consciousness she denies symptoms of nausea or vomiting. PCP: Anupam Martinez MD      Past History     Past Medical History:  Past Medical History:   Diagnosis Date    Autoimmune disease ([facility] [address]     MS    Gross hematuria     Hypertension     Kidney stones     MS (congenital mitral stenosis)     [facility]cerebrum[facility])        Past Surgical History:  Past Surgical History:   Procedure Laterality Date    HX UROLOGICAL  12/2016    stent placement       Family History:  No family history on file. Social History:  Social History     Tobacco Use    Smoking status: Former Smoker    Smokeless tobacco: Never Used   Substance Use Topics    Alcohol use: Yes     Comment: socially    Drug use: No       Allergies: Allergies   Allergen Reactions    Codeine Shortness of Breath, Other (comments) and Rash    Interferon Beta-1a Other (comments)     ELEVATED LIVER FUNCTION TESTS    Penicillins Hives       Review of Systems     Review of Systems   All other systems reviewed and are negative. 10 point review of systems otherwise negative unless noted in HPI.     Physical Exam       Physical Exam  Constitutional:       Appearance: She is well-developed. HENT:      Head: Normocephalic and atraumatic. Eyes:      Pupils: Pupils are equal, round, and reactive to light. Cardiovascular:      Rate and Rhythm: Normal rate and regular rhythm. Heart sounds: Normal heart sounds. No murmur heard. No friction rub. Pulmonary:      Effort: Pulmonary effort is normal. No respiratory distress. Breath sounds: Normal breath sounds. No wheezing. Abdominal:      General: There is no distension. Palpations: Abdomen is soft. Tenderness: There is no abdominal tenderness. There is no guarding or rebound. Musculoskeletal:         General: Normal range of motion. Cervical back: Normal range of motion and neck supple. Skin:     General: Skin is warm and dry. Neurological:      Mental Status: She is alert. Comments: Patient is oriented x2   Psychiatric:         Behavior: Behavior normal.         Thought Content: Thought content normal.         Diagnostic Study Results     Vital Signs  EKG: EKG shows sinus at 98 with a normal axis and normal intervals there is no ST elevation or depression and no hypertrophy  Labs: CBC is 6 but patient has a bandemia of 14% shift urine is negative chemistry unremarkable  imaging: Chest x-ray shows potentially pneumonia     Medical Decision Making     ED Course: Progress Notes, Reevaluation, and Consults:    I will be the provider of record for this patient. Provider Notes (Medical Decision Making): This is a 57-year-old female with a history of MS who presents for concern of infection on top of a fall. Based on the fall itself and on think she is a head CT or further evaluation she really has no complaints and she is oriented x2 although she does not know the year I think she would know if she had any significant injuries.  She does have a temperature of 100.9 so will assess for any evidence of infection       Discussed with the patient's daughter who is now here. Apparently the family heard a noise around 4 AM who is a smoker who has to go to the garage to smoke was found on the ground in the garage after falling. Patient is having increasing confusion for about a month with increasing constipation patient was unsure if this is an acute stroke or related to an MS flare. Patient septic she has a bandemia white counts are increased confusion head CT unremarkable urine unremarkable x-ray with potential pneumonia will clarify with CT thorax at this point I do not think she needs a spinal tap discussed with the hospitalist service will admit to Dr. Fady Beck          Diagnosis     Clinical Impression: No diagnosis found. Disposition:        Patient's Medications   Start Taking    No medications on file   Continue Taking    ASPIRIN (ASPIRIN) 325 MG TABLET    Take 325 mg by mouth daily. ASPIRIN (ASPIRIN) 325 MG TABLET    325 mg. BACLOFEN (LIORESAL) 10 MG TABLET    10 mg. BACLOFEN PO    Take  by mouth. CIPROFLOXACIN HCL (CIPRO) 500 MG TABLET    Take 1 Tab by mouth two (2) times a day. CLONAZEPAM (KLONOPIN) 0.5 MG TABLET        CLONAZEPAM (KLONOPIN) 0.5 MG TABLET    One po tid prn tremor    CYCLOBENZAPRINE (FLEXERIL) 10 MG TABLET        ERGOCALCIFEROL (ERGOCALCIFEROL) 50,000 UNIT CAPSULE    50,000 Units. ERGOCALCIFEROL (VITAMIN D2) 50,000 UNIT CAPSULE    Take 50,000 Units by mouth. HYDROCHLOROTHIAZIDE (HYDRODIURIL) 25 MG TABLET        LISINOPRIL (PRINIVIL, ZESTRIL) 20 MG TABLET        LISINOPRIL (PRINIVIL, ZESTRIL) 20 MG TABLET    20 mg. MULTIVITAMIN (ONE A DAY) TABLET    Take 1 Tab by mouth daily. OXYCODONE-ACETAMINOPHEN (PERCOCET) 5-325 MG PER TABLET    Take 1-2 Tabs by mouth every four (4) hours as needed for Pain. Max Daily Amount: 12 Tabs. SIMVASTATIN (ZOCOR) 20 MG TABLET        TAMSULOSIN (FLOMAX) 0.4 MG CAPSULE    Take 1 Cap by mouth daily.  Indications: take while stent in place   These Medications have changed    No medications on file   Stop Taking    No medications on file

## 2022-03-11 NOTE — CONSULTS
Infectious Disease Consultation Note        Reason: Evaluate for sepsis    Current abx Prior abx   Levofloxacin, vancomycin since 3/11 Cefepime on 3/11     Lines:       Assessment :    74 y.o. female female with past medical history of multiple sclerosis, hypertension, renal stones, stroke presented to [facility] ED  on 3/11 with altered mental status, status post fall. Fully vaccinated for COVID-19. Booster vaccine in November 2021    Now with low-grade fever, poor appetite for about a week, altered mentation    I agree that clinical presentation concerning for sepsis. However no definitive etiology identified per available lab/imaging findings/exam.  Differential diagnosis includes dehydration, volume depletion due to recent viral infection  (including COVID 19 )versus acute on chronic cholecystitis versus transient bloodstream infection secondary to recent constipation    Altered mental status-likely metabolic encephalopathy. Improving. Still not at baseline per family at bedside    Antibiotic management complicated due to documented history of penicillin allergy-hives per report. Unable to confirm nature of allergic reaction due to patient's altered mentation. Will get details about penicillin allergy once mentation improved. Patient tolerated cefepime in the emergency room    Recommendations:    1. Continue levofloxacin, vancomycin for now  2. Obtain right upper quadrant ultrasound to evaluate for cholecystitis  3. Follow-up urine culture, blood culture  4. Obtain respiratory viral panel PCR with COVID-19  5. Further recommendations based on the above test results, clinical course    Thank you for consultation request. Above plan was discussed in details with patient, family and dr Shree Whiting. Please call me if any further questions or concerns. Will continue to participate in the care of this patient.   HPI:    74 y.o. female female with past medical history of multiple sclerosis, hypertension, renal PT STATES NO ONE HAS CONTACTED HER REGARDING A FINGER SPLINT    PT [phone] stones, stroke presented to  [facility] ED  on 3/11 with altered mental status, status post fall. Patient is a recollect the events which led to her hospitalization. Obtained history by talking to her niece at bedside, review of records. Per family, patient has had poor p.o. intake for the past few days prior to admission. When I asked the patient about this, she states that she just did not feel like eating. this morning the patient had a fall so her daughter called 911. He was noted to have fever with temperature 100.9, tachycardia, bandemia 14%. There was concern for sepsis. Patient was given a dose of cefepime and subsequently given levofloxacin, vancomycin. I have been consulted for further recommendations. Patient states that she feels better now compared to when she came in. However still does not feel at her baseline. She complains of pain in the lower back status post fall. She denies hitting her head. She denies any joint pains. She does not recollect any abdominal pain, diarrhea, constipation or burning while urinating. Fully vaccinated for COVID-19. Has taken the booster vaccine in November 2021. Past Medical History:   Diagnosis Date    Autoimmune disease ([facility] [address]     MS    Gross hematuria     Hypertension     Kidney stones     MS (congenital mitral stenosis)     Stroke (cerebrum) ([facility] [address]        Past Surgical History:   Procedure Laterality Date    HX UROLOGICAL  12/2016    stent placement       Patient's Medications   Start Taking    No medications on file   Continue Taking    ASPIRIN (ASPIRIN) 325 MG TABLET    Take 325 mg by mouth daily. ASPIRIN (ASPIRIN) 325 MG TABLET    325 mg. BACLOFEN (LIORESAL) 10 MG TABLET    10 mg. BACLOFEN PO    Take  by mouth. CIPROFLOXACIN HCL (CIPRO) 500 MG TABLET    Take 1 Tab by mouth two (2) times a day.     CLONAZEPAM (KLONOPIN) 0.5 MG TABLET        CLONAZEPAM (KLONOPIN) 0.5 MG TABLET    One po tid prn tremor    CYCLOBENZAPRINE (FLEXERIL) 10 MG TABLET        ERGOCALCIFEROL (ERGOCALCIFEROL) 50,000 UNIT CAPSULE    50,000 Units. ERGOCALCIFEROL (VITAMIN D2) 50,000 UNIT CAPSULE    Take 50,000 Units by mouth. HYDROCHLOROTHIAZIDE (HYDRODIURIL) 25 MG TABLET        LISINOPRIL (PRINIVIL, ZESTRIL) 20 MG TABLET        LISINOPRIL (PRINIVIL, ZESTRIL) 20 MG TABLET    20 mg. MULTIVITAMIN (ONE A DAY) TABLET    Take 1 Tab by mouth daily. OXYCODONE-ACETAMINOPHEN (PERCOCET) 5-325 MG PER TABLET    Take 1-2 Tabs by mouth every four (4) hours as needed for Pain. Max Daily Amount: 12 Tabs. SIMVASTATIN (ZOCOR) 20 MG TABLET        TAMSULOSIN (FLOMAX) 0.4 MG CAPSULE    Take 1 Cap by mouth daily. Indications: take while stent in place   These Medications have changed    No medications on file   Stop Taking    No medications on file       Current Facility-Administered Medications   Medication Dose Route Frequency    sodium chloride (NS) flush 5-10 mL  5-10 mL IntraVENous PRN    levoFLOXacin (LEVAQUIN) 750 mg in D5W IVPB  750 mg IntraVENous Q24H    [START ON 3/12/2022] vancomycin (VANCOCIN) 1,000 mg in 0.9% sodium chloride 250 mL (VIAL-MATE)  1,000 mg IntraVENous Q24H     Current Outpatient Medications   Medication Sig    lisinopril (PRINIVIL, ZESTRIL) 20 mg tablet 20 mg.    ergocalciferol (ERGOCALCIFEROL) 50,000 unit capsule 50,000 Units.  aspirin (ASPIRIN) 325 mg tablet 325 mg.    clonazePAM (KLONOPIN) 0.5 mg tablet One po tid prn tremor    baclofen (LIORESAL) 10 mg tablet 10 mg.    oxyCODONE-acetaminophen (PERCOCET) 5-325 mg per tablet Take 1-2 Tabs by mouth every four (4) hours as needed for Pain. Max Daily Amount: 12 Tabs.  tamsulosin (FLOMAX) 0.4 mg capsule Take 1 Cap by mouth daily. Indications: take while stent in place    ciprofloxacin HCl (CIPRO) 500 mg tablet Take 1 Tab by mouth two (2) times a day.     simvastatin (ZOCOR) 20 mg tablet     lisinopril (PRINIVIL, ZESTRIL) 20 mg tablet     hydroCHLOROthiazide (HYDRODIURIL) 25 mg tablet     cyclobenzaprine (FLEXERIL) 10 mg tablet     clonazePAM (KLONOPIN) 0.5 mg tablet     multivitamin (ONE A DAY) tablet Take 1 Tab by mouth daily.  aspirin (ASPIRIN) 325 mg tablet Take 325 mg by mouth daily.  BACLOFEN PO Take  by mouth.  ergocalciferol (VITAMIN D2) 50,000 unit capsule Take 50,000 Units by mouth. Allergies: Codeine, Interferon beta-1a, and Penicillins    No family history on file. Social History     Socioeconomic History    Marital status:      Spouse name: Not on file    Number of children: Not on file    Years of education: Not on file    Highest education level: Not on file   Occupational History    Not on file   Tobacco Use    Smoking status: Former Smoker    Smokeless tobacco: Never Used   Substance and Sexual Activity    Alcohol use: Yes     Comment: socially    Drug use: No    Sexual activity: Not on file   Other Topics Concern    Not on file   Social History Narrative    Not on file     Social Determinants of Health     Financial Resource Strain:     Difficulty of Paying Living Expenses: Not on file   Food Insecurity:     Worried About [address] [employer] in the Last Year: Not on file    [address] in the Last Year: Not on file   Transportation Needs:     Lack of Transportation (Medical): Not on file    Lack of Transportation (Non-Medical):  Not on file   Physical Activity:     Days of Exercise per Week: Not on file    Minutes of Exercise per Session: Not on file   Stress:     Feeling of Stress : Not on file   Social Connections:     Frequency of Communication with Friends and Family: Not on file    Frequency of Social Gatherings with Friends and Family: Not on file    Attends Cheondoism Services: Not on file    Active Member of Clubs or Organizations: Not on file    Attends Club or Organization Meetings: Not on file    Marital Status: Not on file   Intimate Partner Violence:     Fear of Current or Ex-Partner: Not on file   [employer] Abused: Not on file    Physically Abused: Not on file    Sexually Abused: Not on file   Housing Stability:     Unable to Pay for Housing in the Last Year: Not on file    Number of [place] in the Last Year: Not on file    Unstable Housing in the Last Year: Not on file     Social History     Tobacco Use   Smoking Status Former Smoker   Smokeless Tobacco Never Used        Temp (24hrs), Av.9 °F (38.3 °C), Min:100.9 °F (38.3 °C), Max:100.9 °F (38.3 °C)    Visit Vitals  BP (!) 121/108   Pulse 100   Temp (!) 100.9 °F (38.3 °C)   Resp 22   Ht 5' 5\" (1.651 m)   Wt 49.4 kg (109 lb)   SpO2 99%   BMI 18.14 kg/m²       ROS: 12 point ROS obtained in details. Pertinent positives as mentioned in HPI,   otherwise negative    Physical Exam:    General: Well developed, well nourished female laying on the bed AAOx3 in no acute distress. General:   awake alert and oriented   HEENT:  Normocephalic, atraumatic,EOMI, no scleral icterus or pallor; no conjunctival hemmohage;  nasal and oral mucous are moist and without evidence of lesions. Filler dental cavities, missing maxillary teeth neck supple, no bruits. Lymph Nodes:   no cervical, axillary or inguinal adenopathy   Lungs:   non-labored, bilaterally clear to auscultation- no crackles wheezes rales or rhonchi   Heart:  RRR, s1 and s2; no  rubs or gallops, no edema, + pedal pulses   Abdomen:  soft, non-distended, active bowel sounds, no hepatomegaly, no splenomegaly. Non-tender   Genitourinary:  deferred   Extremities:   no clubbing, cyanosis; no joint effusions or swelling; Full ROM of all large joints to the upper and lower extremities; muscle mass appropriate for age   Neurologic:  No gross focal sensory abnormalities; 5/5 muscle strength to upper and lower extremities. Speech appropriate.  Cranial nerves intact                        Skin:  No rash or ulcers noted   Back:  no spinal or paraspinal muscle tenderness or rigidity, no CVA tenderness     Psychiatric: No suicidal or homicidal ideations, appropriate mood and affect         Labs: Results:   Chemistry Recent Labs     03/11/22  0620   GLU 98      K 4.1      CO2 25   BUN 17   CREA 1.07   CA 9.1   AGAP 7   BUCR 16   *   TP 6.9   ALB 2.7*   GLOB 4.2*   AGRAT 0.6*      CBC w/Diff Recent Labs     03/11/22  0620   WBC 6.4   RBC 3.79*   HGB 11.3*   HCT 36.3      GRANS 79*   LYMPH 5*   EOS 0      Microbiology No results for input(s): CULT in the last 72 hours. RADIOLOGY:    All available imaging studies/reports in [facility] for this admission were reviewed      Disclaimer: Sections of this note are dictated utilizing voice recognition software, which may have resulted in some phonetic based errors in grammar and contents. Even though attempts were made to correct all the mistakes, some may have been missed, and remained in the body of the document. If questions arise, please contact our department.     Dr. Sejal Granger, Infectious Disease Specialist  [phone]  March 11, 2022  11:18 AM

## 2022-03-11 NOTE — ED TRIAGE NOTES
Pt arrived via [place] [id number] after fall this morning from home family saying pt having increasing confusion and poor apetite over the past week.  NAD  PMH MS

## 2022-03-11 NOTE — H&P
History and Physical    Patient: Vincenzo Mejia MRN: [id number]  SSN: [id number]    YOB: 1955  Age: 74 y.o. Sex: female      Cesar Aguirre MD    C/C : fall / confusion     Subjective:      Vincenzo Mejia is a 74 y.o. female who has past medical history of hypertension history of multiple small strokes MS diagnosed long time back now admitted for history of falls, multiple falls in the past.  Patient was actively seeing neurologist in the area however he retired they do not remember the name. Patient has some lapse of memory regarding her history most history obtained from family member in room with patient    Patient was in her usual state of mind she is functional she walks around in the house without any help, eats well, has a history of smoking and occasional alcohol drinking. Developed anorexia and not eating well family members also found that she was confused and then she started having falls and she was brought to the emergency room. In the emergency room her lactic acid level was high some  Bands, suspected sepsis patient was started on broad-spectrum antibiotics pancultured ID consultation done. Past Medical History:   Diagnosis Date    Autoimmune disease ([facility] [address]     MS    Gross hematuria     Hypertension     Kidney stones     MS (congenital mitral stenosis)     Stroke (cerebrum[facility])      Past Surgical History:   Procedure Laterality Date    HX UROLOGICAL  12/2016    stent placement      No family history on file. Social History     Tobacco Use    Smoking status: Former Smoker    Smokeless tobacco: Never Used   Substance Use Topics    Alcohol use: Yes     Comment: socially      Prior to Admission medications    Medication Sig Start Date End Date Taking? Authorizing Provider   lisinopril (PRINIVIL, ZESTRIL) 20 mg tablet 20 mg. 10/20/16   Provider, Historical   ergocalciferol (ERGOCALCIFEROL) 50,000 unit capsule 50,000 Units.  4/1/16   Provider, Historical aspirin (ASPIRIN) 325 mg tablet 325 mg. 9/4/13   Provider, Historical   clonazePAM (KLONOPIN) 0.5 mg tablet One po tid prn tremor 11/23/16   Provider, Historical   baclofen (LIORESAL) 10 mg tablet 10 mg. 10/20/16   Provider, Historical   oxyCODONE-acetaminophen (PERCOCET) 5-325 mg per tablet Take 1-2 Tabs by mouth every four (4) hours as needed for Pain. Max Daily Amount: 12 Tabs. 2/20/17   Reji Campbell MD   tamsulosin (FLOMAX) 0.4 mg capsule Take 1 Cap by mouth daily. Indications: take while stent in place 2/20/17   Reji Campbell MD   ciprofloxacin HCl (CIPRO) 500 mg tablet Take 1 Tab by mouth two (2) times a day. 2/20/17   Gabriela Burger MD   simvastatin (ZOCOR) 20 mg tablet  12/10/16   Provider, Historical   lisinopril (PRINIVIL, ZESTRIL) 20 mg tablet  10/20/16   Provider, Historical   hydroCHLOROthiazide (HYDRODIURIL) 25 mg tablet  10/20/16   Provider, Historical   cyclobenzaprine (FLEXERIL) 10 mg tablet  12/10/16   Provider, Historical   clonazePAM (KLONOPIN) 0.5 mg tablet  11/23/16   Provider, Historical   multivitamin (ONE A DAY) tablet Take 1 Tab by mouth daily. Provider, Historical   aspirin (ASPIRIN) 325 mg tablet Take 325 mg by mouth daily. Provider, Historical   BACLOFEN PO Take  by mouth. Provider, Historical   ergocalciferol (VITAMIN D2) 50,000 unit capsule Take 50,000 Units by mouth. Provider, Historical        Allergies   Allergen Reactions    Codeine Shortness of Breath, Other (comments) and Rash    Interferon Beta-1a Other (comments)     ELEVATED LIVER FUNCTION TESTS    Penicillins Hives           Review of Systems:  positive responses in bold type   Constitutional: Negative for fever, chills, diaphoresis and unexpected weight change. HENT: Negative for ear pain, congestion, sore throat, rhinorrhea, drooling, trouble swallowing, neck pain and tinnitus. Eyes: Negative for photophobia, pain, redness and visual disturbance.    Respiratory: negative for shortness of breath, cough, choking, chest tightness, wheezing or stridor. Cardiovascular: Negative for chest pain, palpitations and leg swelling. Gastrointestinal: Negative for nausea, vomiting, abdominal pain, diarrhea, constipation, blood in stool, abdominal distention and anal bleeding. Genitourinary: Negative for dysuria, urgency, frequency, hematuria, flank pain and difficulty urinating. Musculoskeletal: Negative for back pain and arthralgias. Skin: Negative for color change, rash and wound. Neurological: Negative for dizziness, seizures, syncope, speech difficulty, light-headedness or headaches. Hematological: Does not bruise/bleed easily. Psychiatric/Behavioral: Negative for suicidal ideas, hallucinations, behavioral problems, self-injury or agitation         Objective: Body mass index is 18.14 kg/m². Vitals:    03/11/22 1100 03/11/22 1112 03/11/22 1345 03/11/22 1354   BP: (!) 121/108  117/62    Pulse: 94 100 94 93   Resp:   23 16   Temp:       SpO2: 98% 99% 98% 97%   Weight:       Height:            Physical Exam:  General appearance - alert, well appearing, and in no distress and oriented to person, place, and time  Mental status - alert, oriented to person, place, and time  Eyes - pupils equal and reactive, extraocular eye movements intact  Ears - bilateral TM's and external ear canals normal  Nose - normal and patent, no erythema, discharge or polyps  Mouth - mucous membranes moist, pharynx normal without lesions  Chest - clear to auscultation, no wheezes, rales or rhonchi, symmetric air entry  Heart - normal rate, regular rhythm, normal S1, S2, no murmurs, rubs, clicks or gallops  Abdomen - soft, nontender, nondistended, no masses or organomegaly  Extremities -right arm she was holding in some kind of contracture position but when asked to open the palm she will Osito Bejarano do that no contractures found.          Hospital Problems  Date Reviewed: 2/26/2017          Codes Class Noted POA Fall ICD-10-CM: W19Perry Sosa  ICD-9-CM: E888.9  3/11/2022 Unknown        Community acquired bacterial pneumonia ICD-10-CM: J15.9  ICD-9-CM: 482.9  3/11/2022 Unknown        Acute sepsis ([facility] ICD-10-CM: A41.9  ICD-9-CM: 038.9, 995.91  3/11/2022 Unknown        Sepsis ([facility] ICD-10-CM: A41.9  ICD-9-CM: 038.9, 995.91  3/11/2022 Unknown              CBC:  Lab Results   Component Value Date/Time    WBC 6.4 03/11/2022 06:20 AM    HGB 11.3 (L) 03/11/2022 06:20 AM    HCT 36.3 03/11/2022 06:20 AM    PLATELET 176 25/17/0084 06:20 AM    MCV 95.8 03/11/2022 06:20 AM        CMP:  Lab Results   Component Value Date/Time    Sodium 143 03/11/2022 06:20 AM    Potassium 4.1 03/11/2022 06:20 AM    Chloride 111 03/11/2022 06:20 AM    CO2 25 03/11/2022 06:20 AM    Anion gap 7 03/11/2022 06:20 AM    Glucose 98 03/11/2022 06:20 AM    BUN 17 03/11/2022 06:20 AM    Creatinine 1.07 03/11/2022 06:20 AM    BUN/Creatinine ratio 16 03/11/2022 06:20 AM    GFR est AA >60 03/11/2022 06:20 AM    GFR est non-AA 51 (L) 03/11/2022 06:20 AM    Calcium 9.1 03/11/2022 06:20 AM    Alk. phosphatase 311 (H) 03/11/2022 06:20 AM    Protein, total 6.9 03/11/2022 06:20 AM    Albumin 2.7 (L) 03/11/2022 06:20 AM    Globulin 4.2 (H) 03/11/2022 06:20 AM    A-G Ratio 0.6 (L) 03/11/2022 06:20 AM    ALT (SGPT) 16 03/11/2022 06:20 AM        PT/INR  No results found for: INR, PTMR, PTP, PT1, PT2, INREXT         EKG: No results found for this or any previous visit. Assessment And  Plan:     1 multiple falls  2 multiple sclerosis   3 hypertension  4 multiple old CVAs-?   Early multi-infarct dementia    Plan  -Rule out sepsis very unlikely however  Follow ID consultation ultrasound gallbladder, follow cultures continue antibiotics till then,     -Fall syncope multiple sclerosis   follow MRI rule out new CVA  Follow neurology neurology consultation    Hypertension continue home medication monitor blood pressure  Currently I do not think patient needs any permissive hypertension    -Syncope/falls monitor patient on telemetry: If needed outpatient event monitoring  Carotid study     Signed By: Dc Tate MD     March 11, 2022

## 2022-03-11 NOTE — PROGRESS NOTES
MRI Safety Screening form needs to be filled out and FAXED to [phone] BEFORE MRI can be scheduled. If unable to acquire information from patient, MPOA must be contacted, or screening xrays will need to be ordred.     If pt is Claustrophobic or needs Pain Meds, please have these ordered in advance to help facilitate MRI exam.

## 2022-03-11 NOTE — ED NOTES
Sister of patient allowed patient to get up and ambulate out of bed, sister advised she cannot get out of bed (this is third family member told she cannot get up). Patient returned back to bed and sister given call light to inform nurse if she tries to get out of bed or needs to leave. Verbalizes understanding.

## 2022-03-11 NOTE — PROGRESS NOTES
[facility] Pharmacokinetic Monitoring Service - Vancomycin     Vincenzo Mejia is a 74 y.o. female starting on vancomycin therapy for sepsis. Pharmacy consulted by Dr. Morgan Lora for monitoring and adjustment. Target Concentration: Goal AUC/-600 mg*hr/L    Additional Antimicrobials: Levaquin    Pertinent Laboratory Values: Wt Readings from Last 1 Encounters:   03/11/22 49.4 kg (109 lb)     Temp Readings from Last 1 Encounters:   03/11/22 (!) 100.9 °F (38.3 °C)     No components found for: PROCAL  Estimated Creatinine Clearance: 39.2 mL/min (based on SCr of 1.07 mg/dL). Recent Labs     03/11/22  0620   WBC 6.4       Pertinent Cultures:  Culture Date Source Results   3/11 blood pending   MRSA Nasal Swab: N/A. Non-respiratory infection. .    Plan:  Dosing recommendations based on Bayesian software  Start vancomycin 1gm ivpb q24h  Anticipated AUC of 515 and trough concentration of 15.4 at steady state  Renal labs as indicated   Vancomycin concentration ordered for 3/12 @ 0400   Pharmacy will continue to monitor patient and adjust therapy as indicated    Thank you for the consult,  Stephanie Gannon, [facility]  3/11/2022 9:43 AM

## 2022-03-11 NOTE — REMOTE MONITORING
Spoke with primary RN Rivas Corado regarding 3 and 6 hour Sepsis bundle.     Michele Lane MSN, [address] [facility]  [phone]

## 2022-03-11 NOTE — ED NOTES
Assumed care of patient, patient with daughter at the bedside, states she heard noise and dogs barking this morning and  thought someone was breaking in, they noticed the light was on in the garage and found patient lying on the floor. Daughter reports increased confusion.

## 2022-03-11 NOTE — PROGRESS NOTES
Reason for Renal Dosing:  Per Renal Dosing Policy    Patient clinical status and labs ordered/reviewed. Pt Weight Weight: 49.4 kg (109 lb)   Serum Creatinine Lab Results   Component Value Date/Time    Creatinine 1.07 03/11/2022 06:20 AM       Creatinine Clearance Estimated Creatinine Clearance: 39.2 mL/min (based on SCr of 1.07 mg/dL). BUN Lab Results   Component Value Date/Time    BUN 17 03/11/2022 06:20 AM    BUN, POC 37 (H) 02/20/2017 06:42 AM       WBC Lab Results   Component Value Date/Time    WBC 6.4 03/11/2022 06:20 AM      Temperature Temp: (!) 100.9 °F (38.3 °C)     HR Pulse (Heart Rate): 100     BP BP: (!) 121/108           Drug type: Antibiotic indicated for sepsis      Drug/dose: was adjusted to : Levofloxacin 750mg IVPB q48h    Continue to monitor.     Signed SHRUTHI Soriano [facility]  Date 3/11/2022  Time 11:48 AM

## 2022-03-11 NOTE — PROGRESS NOTES
completed the initial Spiritual Assessment of the patient in bed 14 of the emergency room, and offered Pastoral Care support to the patient. Patient says that she has an advance directive but it is at [facility] and she will have family bring us a copy. Patient does not have any Caodaism/cultural needs that will affect patients preferences in health care. Chaplains will continue to follow and will provide pastoral care on an as needed/requested basis.     Kaylene [facility] Care Department  [phone]

## 2022-03-12 ENCOUNTER — APPOINTMENT (OUTPATIENT)
Dept: CT IMAGING | Age: 67
DRG: 871 | End: 2022-03-12
Attending: INTERNAL MEDICINE
Payer: MEDICARE

## 2022-03-12 LAB
ANION GAP SERPL CALC-SCNC: 6 MMOL/L (ref 3–18)
ATRIAL RATE: 98 BPM
BACTERIA SPEC CULT: NORMAL
BASOPHILS # BLD: 0 K/UL (ref 0–0.1)
BASOPHILS NFR BLD: 0 % (ref 0–2)
BUN SERPL-MCNC: 19 MG/DL (ref 7–18)
BUN/CREAT SERPL: 19 (ref 12–20)
CALCIUM SERPL-MCNC: 8.8 MG/DL (ref 8.5–10.1)
CALCULATED P AXIS, ECG09: 48 DEGREES
CALCULATED R AXIS, ECG10: 59 DEGREES
CALCULATED T AXIS, ECG11: 62 DEGREES
CC UR VC: NORMAL
CHLORIDE SERPL-SCNC: 113 MMOL/L (ref 100–111)
CO2 SERPL-SCNC: 23 MMOL/L (ref 21–32)
CREAT SERPL-MCNC: 1.01 MG/DL (ref 0.6–1.3)
DIAGNOSIS, 93000: NORMAL
DIFFERENTIAL METHOD BLD: ABNORMAL
EOSINOPHIL # BLD: 0 K/UL (ref 0–0.4)
EOSINOPHIL NFR BLD: 0 % (ref 0–5)
ERYTHROCYTE [DISTWIDTH] IN BLOOD BY AUTOMATED COUNT: 16.3 % (ref 11.6–14.5)
GLUCOSE SERPL-MCNC: 72 MG/DL (ref 74–99)
HCT VFR BLD AUTO: 27.4 % (ref 35–45)
HGB BLD-MCNC: 8.4 G/DL (ref 12–16)
IMM GRANULOCYTES # BLD AUTO: 0 K/UL
IMM GRANULOCYTES NFR BLD AUTO: 0 %
LYMPHOCYTES # BLD: 0.9 K/UL (ref 0.9–3.6)
LYMPHOCYTES NFR BLD: 4 % (ref 21–52)
MCH RBC QN AUTO: 29.5 PG (ref 24–34)
MCHC RBC AUTO-ENTMCNC: 30.7 G/DL (ref 31–37)
MCV RBC AUTO: 96.1 FL (ref 78–100)
MONOCYTES # BLD: 0 K/UL (ref 0.05–1.2)
MONOCYTES NFR BLD: 0 % (ref 3–10)
NEUTS BAND NFR BLD MANUAL: 3 % (ref 0–5)
NEUTS SEG # BLD: 21.8 K/UL (ref 1.8–8)
NEUTS SEG NFR BLD: 93 % (ref 40–73)
NRBC # BLD: 0 K/UL (ref 0–0.01)
NRBC BLD-RTO: 0 PER 100 WBC
P-R INTERVAL, ECG05: 120 MS
PLATELET # BLD AUTO: 222 K/UL (ref 135–420)
PLATELET COMMENTS,PCOM: ABNORMAL
PMV BLD AUTO: 10.6 FL (ref 9.2–11.8)
POTASSIUM SERPL-SCNC: 3.2 MMOL/L (ref 3.5–5.5)
Q-T INTERVAL, ECG07: 324 MS
QRS DURATION, ECG06: 78 MS
QTC CALCULATION (BEZET), ECG08: 413 MS
RBC # BLD AUTO: 2.85 M/UL (ref 4.2–5.3)
RBC MORPH BLD: ABNORMAL
SERVICE CMNT-IMP: NORMAL
SODIUM SERPL-SCNC: 142 MMOL/L (ref 136–145)
VANCOMYCIN SERPL-MCNC: 6 UG/ML (ref 5–40)
VENTRICULAR RATE, ECG03: 98 BPM
WBC # BLD AUTO: 22.7 K/UL (ref 4.6–13.2)

## 2022-03-12 PROCEDURE — 65660000000 HC RM CCU STEPDOWN

## 2022-03-12 PROCEDURE — 74011000258 HC RX REV CODE- 258: Performed by: INTERNAL MEDICINE

## 2022-03-12 PROCEDURE — 74011250636 HC RX REV CODE- 250/636: Performed by: INTERNAL MEDICINE

## 2022-03-12 PROCEDURE — 74011000250 HC RX REV CODE- 250: Performed by: INTERNAL MEDICINE

## 2022-03-12 PROCEDURE — 85025 COMPLETE CBC W/AUTO DIFF WBC: CPT

## 2022-03-12 PROCEDURE — 36415 COLL VENOUS BLD VENIPUNCTURE: CPT

## 2022-03-12 PROCEDURE — 74177 CT ABD & PELVIS W/CONTRAST: CPT

## 2022-03-12 PROCEDURE — 74011000636 HC RX REV CODE- 636: Performed by: INTERNAL MEDICINE

## 2022-03-12 PROCEDURE — 2709999900 HC NON-CHARGEABLE SUPPLY

## 2022-03-12 PROCEDURE — 74011250637 HC RX REV CODE- 250/637: Performed by: INTERNAL MEDICINE

## 2022-03-12 PROCEDURE — 80048 BASIC METABOLIC PNL TOTAL CA: CPT

## 2022-03-12 PROCEDURE — 99232 SBSQ HOSP IP/OBS MODERATE 35: CPT | Performed by: INTERNAL MEDICINE

## 2022-03-12 PROCEDURE — 80202 ASSAY OF VANCOMYCIN: CPT

## 2022-03-12 RX ORDER — POTASSIUM CHLORIDE 20 MEQ/1
40 TABLET, EXTENDED RELEASE ORAL 3 TIMES DAILY
Status: COMPLETED | OUTPATIENT
Start: 2022-03-12 | End: 2022-03-13

## 2022-03-12 RX ORDER — VANCOMYCIN HYDROCHLORIDE
1250 ONCE
Status: DISCONTINUED | OUTPATIENT
Start: 2022-03-12 | End: 2022-03-12 | Stop reason: ALTCHOICE

## 2022-03-12 RX ADMIN — THERA TABS 1 TABLET: TAB at 09:14

## 2022-03-12 RX ADMIN — SODIUM CHLORIDE, PRESERVATIVE FREE 10 ML: 5 INJECTION INTRAVENOUS at 21:30

## 2022-03-12 RX ADMIN — SODIUM CHLORIDE, PRESERVATIVE FREE 10 ML: 5 INJECTION INTRAVENOUS at 00:11

## 2022-03-12 RX ADMIN — CYCLOBENZAPRINE 10 MG: 10 TABLET, FILM COATED ORAL at 13:25

## 2022-03-12 RX ADMIN — CEFEPIME HYDROCHLORIDE 2 G: 2 INJECTION, POWDER, FOR SOLUTION INTRAVENOUS at 12:49

## 2022-03-12 RX ADMIN — POTASSIUM CHLORIDE 40 MEQ: 20 TABLET, EXTENDED RELEASE ORAL at 21:28

## 2022-03-12 RX ADMIN — VANCOMYCIN HYDROCHLORIDE 1250 MG: 10 INJECTION, POWDER, LYOPHILIZED, FOR SOLUTION INTRAVENOUS at 09:08

## 2022-03-12 RX ADMIN — LISINOPRIL 40 MG: 20 TABLET ORAL at 09:14

## 2022-03-12 RX ADMIN — SODIUM CHLORIDE, PRESERVATIVE FREE 10 ML: 5 INJECTION INTRAVENOUS at 06:45

## 2022-03-12 RX ADMIN — POTASSIUM CHLORIDE 40 MEQ: 20 TABLET, EXTENDED RELEASE ORAL at 17:32

## 2022-03-12 RX ADMIN — CYCLOBENZAPRINE 10 MG: 10 TABLET, FILM COATED ORAL at 04:36

## 2022-03-12 RX ADMIN — ATORVASTATIN CALCIUM 80 MG: 40 TABLET, FILM COATED ORAL at 21:27

## 2022-03-12 RX ADMIN — IOPAMIDOL 100 ML: 612 INJECTION, SOLUTION INTRAVENOUS at 14:17

## 2022-03-12 RX ADMIN — ASPIRIN 325 MG ORAL TABLET 325 MG: 325 PILL ORAL at 21:27

## 2022-03-12 RX ADMIN — SODIUM CHLORIDE, PRESERVATIVE FREE 10 ML: 5 INJECTION INTRAVENOUS at 17:32

## 2022-03-12 RX ADMIN — METOPROLOL SUCCINATE 100 MG: 100 TABLET, EXTENDED RELEASE ORAL at 09:13

## 2022-03-12 RX ADMIN — ACETAMINOPHEN 650 MG: 325 TABLET ORAL at 04:36

## 2022-03-12 RX ADMIN — CEFEPIME HYDROCHLORIDE 2 G: 2 INJECTION, POWDER, FOR SOLUTION INTRAVENOUS at 21:28

## 2022-03-12 NOTE — PROGRESS NOTES
Infectious Disease progress Note        Reason: Evaluate for sepsis    Current abx Prior abx   Levofloxacin, vancomycin since 3/11 Cefepime on 3/11     Lines:       Assessment :    74 y.o. female female with past medical history of multiple sclerosis, hypertension, renal stones, stroke presented to [facility] ED  on 3/11 with altered mental status, status post fall. Fully vaccinated for COVID-19. Booster vaccine in November 2021    Now with low-grade fever, poor appetite for about a week, altered mentation    clinical presentation c/w  Sepsis (POA) due to gram negatives bloodstream infection-positive blood culture 3/11    Exact source of gram-negative bloodstream infection not entirely clear at this time. Differential diagnosis includes acute on chronic cholecystitis versus transient bloodstream infection secondary to recent constipation  Lack of significant pyuria argues against cystitis. Await urine cultures    Negative covid-19 pcr 3/11/22    Altered mental status-likely metabolic encephalopathy. Improving. Still not at baseline per family at bedside    Antibiotic management complicated due to documented history of penicillin allergy-hives per report. Unable to confirm nature of allergic reaction due to patient's altered mentation. Patient tolerated cefepime in the emergency room. Hence will use cephalosporin    Recommendations:    1. Continue levofloxacin. Discontinue vancomycin. Start cefepime  2. Follow-up right upper quadrant ultrasound to evaluate for cholecystitis. Obtain CT abdomen/pelvis with IV contrast  3. Follow-up urine culture, identification/susceptibility of gram-negative gigi in blood culture  4. Repeat blood culture tomorrow  5. D/c droplet plus isolation     Above plan was discussed in details with dr Terry Suero. Time spent greater than 20 minutes. please call me if any further questions or concerns. Will continue to participate in the care of this patient.   HPI:        Past Medical History:   Diagnosis Date    Autoimmune disease ([facility] [address]     MS    Gross hematuria     Hypertension     Kidney stones     MS (congenital mitral stenosis)     Stroke (cerebrum) (HCC)        Past Surgical History:   Procedure Laterality Date    HX UROLOGICAL  12/2016    stent placement       Current Discharge Medication List      CONTINUE these medications which have NOT CHANGED    Details   metoprolol succinate (TOPROL-XL) 100 mg tablet Take 100 mg by mouth daily. atorvastatin (LIPITOR) 80 mg tablet Take 80 mg by mouth nightly. lisinopriL (PRINIVIL, ZESTRIL) 40 mg tablet Take 40 mg by mouth daily. cyclobenzaprine (FLEXERIL) 10 mg tablet Take 10 mg by mouth every eight (8) hours as needed for Muscle Spasm(s). clonazePAM (KLONOPIN) 0.5 mg tablet Take 0.5 mg by mouth three (3) times daily as needed (tremor). multivitamin (ONE A DAY) tablet Take 1 Tab by mouth daily. aspirin (ASPIRIN) 325 mg tablet Take 325 mg by mouth nightly.          STOP taking these medications       simvastatin (ZOCOR) 20 mg tablet Comments:   Reason for Stopping:               Current Facility-Administered Medications   Medication Dose Route Frequency    cefepime (MAXIPIME) 2 g in sterile water (preservative free) 10 mL IV syringe  2 g IntraVENous Q8H    sodium chloride (NS) flush 5-10 mL  5-10 mL IntraVENous PRN    [START ON 3/13/2022] levoFLOXacin (LEVAQUIN) 750 mg in D5W IVPB  750 mg IntraVENous Q48H    aspirin tablet 325 mg  325 mg Oral QHS    clonazePAM (KlonoPIN) tablet 0.5 mg  0.5 mg Oral TID PRN    cyclobenzaprine (FLEXERIL) tablet 10 mg  10 mg Oral Q8H PRN    lisinopriL (PRINIVIL, ZESTRIL) tablet 40 mg  40 mg Oral DAILY    therapeutic multivitamin (THERAGRAN) tablet 1 Tablet  1 Tablet Oral DAILY    atorvastatin (LIPITOR) tablet 80 mg  80 mg Oral QHS    sodium chloride (NS) flush 5-40 mL  5-40 mL IntraVENous Q8H    sodium chloride (NS) flush 5-40 mL  5-40 mL IntraVENous PRN    acetaminophen (TYLENOL) tablet 650 mg  650 mg Oral Q6H PRN    Or    acetaminophen (TYLENOL) suppository 650 mg  650 mg Rectal Q6H PRN    polyethylene glycol (MIRALAX) packet 17 g  17 g Oral DAILY PRN    promethazine (PHENERGAN) tablet 12.5 mg  12.5 mg Oral Q6H PRN    Or    ondansetron (ZOFRAN) injection 4 mg  4 mg IntraVENous Q6H PRN    enoxaparin (LOVENOX) injection 40 mg  40 mg SubCUTAneous DAILY    metoprolol succinate (TOPROL-XL) tablet 100 mg  100 mg Oral DAILY       Allergies: Codeine, Interferon beta-1a, and Penicillins    History reviewed. No pertinent family history. Social History     Socioeconomic History    Marital status:      Spouse name: Not on file    Number of children: Not on file    Years of education: Not on file    Highest education level: Not on file   Occupational History    Not on file   Tobacco Use    Smoking status: Former Smoker    Smokeless tobacco: Never Used   Substance and Sexual Activity    Alcohol use: Yes     Comment: socially    Drug use: No    Sexual activity: Not on file   Other Topics Concern    Not on file   Social History Narrative    Not on file     Social Determinants of Health     Financial Resource Strain:     Difficulty of Paying Living Expenses: Not on file   Food Insecurity:     Worried About [address] [employer] in the Last Year: Not on file    [address] in the Last Year: Not on file   Transportation Needs:     Lack of Transportation (Medical): Not on file    Lack of Transportation (Non-Medical):  Not on file   Physical Activity:     Days of Exercise per Week: Not on file    Minutes of Exercise per Session: Not on file   Stress:     Feeling of Stress : Not on file   Social Connections:     Frequency of Communication with Friends and Family: Not on file    Frequency of Social Gatherings with Friends and Family: Not on file    Attends Christianity Services: Not on file    Active Member of Clubs or Organizations: Not on file    Attends Club or Organization Meetings: Not on file    Marital Status: Not on file   Intimate Partner Violence:     Fear of Current or Ex-Partner: Not on file    Emotionally Abused: Not on file    Physically Abused: Not on file    Sexually Abused: Not on file   Housing Stability:     Unable to Pay for Housing in the Last Year: Not on file    Number of Places Lived in the Last Year: Not on file    Unstable Housing in the Last Year: Not on file     Social History     Tobacco Use   Smoking Status Former Smoker   Smokeless Tobacco Never Used        Temp (24hrs), Av.8 °F (37.1 °C), Min:97.9 °F (36.6 °C), Max:99.8 °F (37.7 °C)    Visit Vitals  /63 (BP 1 Location: Right lower arm, BP Patient Position: At rest)   Pulse 77   Temp 97.9 °F (36.6 °C)   Resp 18   Ht 5' 5\" (1.651 m)   Wt 49.4 kg (109 lb)   SpO2 98%   BMI 18.14 kg/m²       ROS:     Physical Exam:      Labs: Results:   Chemistry Recent Labs     22  0404 22  0620   GLU 72* 98    143   K 3.2* 4.1   * 111   CO2 23 25   BUN 19* 17   CREA 1.01 1.07   CA 8.8 9.1   AGAP 6 7   BUCR 19 16   AP  --  311*   TP  --  6.9   ALB  --  2.7*   GLOB  --  4.2*   AGRAT  --  0.6*      CBC w/Diff Recent Labs     22  0404 22  0620   WBC 22.7* 6.4   RBC 2.85* 3.79*   HGB 8.4* 11.3*   HCT 27.4* 36.3    262   GRANS 93* 79*   LYMPH 4* 5*   EOS 0 0      Microbiology Recent Labs     22  0730 22  0715   CULT GRAM NEGATIVE RODS GROWING IN BOTH BOTTLES DRAWN* GRAM NEGATIVE RODS GROWING IN BOTH BOTTLES DRAWN*          RADIOLOGY:    All available imaging studies/reports in [facility] for this admission were reviewed      Disclaimer: Sections of this note are dictated utilizing voice recognition software, which may have resulted in some phonetic based errors in grammar and contents. Even though attempts were made to correct all the mistakes, some may have been missed, and remained in the body of the document.  If questions arise, please contact our department.     Dr. Priscilla Heard, Infectious Disease Specialist  [phone]  March 12, 2022  11:18 AM

## 2022-03-12 NOTE — PROGRESS NOTES
[facility]  [facility]    Patient: Chris Almazan Age: 74 y.o. : 1955 MR#: [id number] SSN: [id number]  Date/Time: 3/12/2022     C/C:AMS      Subjective:   HPI : Patient with history of multiple sclerosis multiple CVA, , chronic smoking hypertension hyperlipidemia being admitted with altered mental status, also patient had a fever and lactic acidosis suspecting sepsis patient's blood culture now positive for gram-negative bacilli repeat cultures pending, ID aware. Review of Systems: Patient lying in bed, alert awake oriented multiple family members in room she is very conversive does not appear to be in distress or pain or any discomfort. Patient's daughter in room with patient I had a long discussion with her with patient's permission regarding patient's management plan  positive responses in bold type   Constitutional: Negative for fever, chills, diaphoresis and unexpected weight change. HENT: Negative for ear pain, congestion, sore throat, rhinorrhea, drooling, trouble swallowing, neck pain and tinnitus. Eyes: Negative for photophobia, pain, redness and visual disturbance. Respiratory: negative for shortness of breath, cough, choking, chest tightness, wheezing or stridor. Cardiovascular: Negative for chest pain, palpitations and leg swelling. Gastrointestinal: Negative for nausea, vomiting, abdominal pain, diarrhea, constipation, blood in stool, abdominal distention and anal bleeding. Genitourinary: Negative for dysuria, urgency, frequency, hematuria, flank pain and difficulty urinating. Musculoskeletal: Negative for back pain and arthralgias. Skin: Negative for color change, rash and wound. Neurological: Negative for dizziness, seizures, syncope, speech difficulty, light-headedness or headaches. Hematological: Does not bruise/bleed easily.    Psychiatric/Behavioral: Negative for suicidal ideas, hallucinations, behavioral problems, self-injury or agitation     Assessment/Plan:     1. Acute metabolic encephalopathy  2 dehydration  3 gram-negative sepsis  4 hypertension  5 small multiple CVAs  6 chronic tobacco abuse   7 history of falls history of possible syncope this admission- SA and statin - follow neuro recs     Plan    -Acute metabolic encephalopathy secondary to sepsis gram-negative, currently on antibiotics per ID, repeat cultures pending follow CT abdomen pelvis rule out intra-abdominal source for infection    -MRI does not show any new CVA    -Strong recommendation to stop tobacco abuse she understands when she says she will try    -Blood pressure fairly controlled continue current treatment    -ID and neurology consultation appreciated    Objective:       General:  Alert, cooperative, no acute distress   HEENT: No facial asymmetry, CONCHITA Henrik, External ears - WNL    Cardiovascular: S1S2 - regular , No Murmur   Pulmonary: Equal expansion , No Use of accessory muscles , No Rales No Rhonchi    GI:  +BS in all four quadrants, soft, non-tender  Extremities:  No edema; 2+ dorsalis pedis pulses bilaterally  Neuro: Alert and oriented X 2.        DVT Prophylaxis:  [x]Lovenox  []Hep SQ  []SCDs  []Coumadin   []On Heparin gtt    [] Eliquis [] Xarelto     Vitals:         VS:   Visit Vitals  /60 (BP 1 Location: Right lower arm, BP Patient Position: At rest)   Pulse 69   Temp 97.1 °F (36.2 °C)   Resp 18   Ht 5' 5\" (1.651 m)   Wt 49.4 kg (109 lb)   SpO2 97%   BMI 18.14 kg/m²      Tmax/24hrs: Temp (24hrs), Av.5 °F (36.9 °C), Min:97.1 °F (36.2 °C), Max:99.8 °F (37.7 °C)  IOBRIEF    Intake/Output Summary (Last 24 hours) at 3/12/2022 1442  Last data filed at 3/12/2022 0106  Gross per 24 hour   Intake --   Output 100 ml   Net -100 ml         Medications:   Current Facility-Administered Medications   Medication Dose Route Frequency    cefepime (MAXIPIME) 2 g in 0.9% sodium chloride (MBP/ADV) 100 mL MBP  2 g IntraVENous Q12H    potassium chloride (K-DUR, KLOR-CON M20) SR tablet 40 mEq  40 mEq Oral TID    sodium chloride (NS) flush 5-10 mL  5-10 mL IntraVENous PRN    [START ON 3/13/2022] levoFLOXacin (LEVAQUIN) 750 mg in D5W IVPB  750 mg IntraVENous Q48H    aspirin tablet 325 mg  325 mg Oral QHS    clonazePAM (KlonoPIN) tablet 0.5 mg  0.5 mg Oral TID PRN    cyclobenzaprine (FLEXERIL) tablet 10 mg  10 mg Oral Q8H PRN    lisinopriL (PRINIVIL, ZESTRIL) tablet 40 mg  40 mg Oral DAILY    therapeutic multivitamin (THERAGRAN) tablet 1 Tablet  1 Tablet Oral DAILY    atorvastatin (LIPITOR) tablet 80 mg  80 mg Oral QHS    sodium chloride (NS) flush 5-40 mL  5-40 mL IntraVENous Q8H    sodium chloride (NS) flush 5-40 mL  5-40 mL IntraVENous PRN    acetaminophen (TYLENOL) tablet 650 mg  650 mg Oral Q6H PRN    Or    acetaminophen (TYLENOL) suppository 650 mg  650 mg Rectal Q6H PRN    polyethylene glycol (MIRALAX) packet 17 g  17 g Oral DAILY PRN    promethazine (PHENERGAN) tablet 12.5 mg  12.5 mg Oral Q6H PRN    Or    ondansetron (ZOFRAN) injection 4 mg  4 mg IntraVENous Q6H PRN    enoxaparin (LOVENOX) injection 40 mg  40 mg SubCUTAneous DAILY    metoprolol succinate (TOPROL-XL) tablet 100 mg  100 mg Oral DAILY       Labs:    Recent Labs     03/12/22  0404 03/11/22  0620   WBC 22.7* 6.4   HGB 8.4* 11.3*   HCT 27.4* 36.3    262     Recent Labs     03/12/22  0404 03/11/22  0620    143   K 3.2* 4.1   * 111   CO2 23 25   GLU 72* 98   BUN 19* 17   CREA 1.01 1.07   CA 8.8 9.1   ALB  --  2.7*   ALT  --  16         Time spent on direct patient care >30 mints     Complexity : High complex - due to multiple medical issues outlined above. CODE Status : Full code    Case discussed with:  [x]Patient  [x] Family daughter[]Nursing  []Case Management         Disclaimer: Sections of this note are dictated utilizing voice recognition software, which may have resulted in some phonetic based errors in grammar and contents.  Even though attempts were made to correct all the mistakes, some may have been missed, and remained in the body of the document. If questions arise, please contact our department.     Signed By: Marychuy Perez MD     March 12, 2022

## 2022-03-12 NOTE — PROGRESS NOTES
[facility] Pharmacokinetic Monitoring Service - Vancomycin    Indication: sepsis  Target Concentration: Goal AUC/-600 mg*hr/L  Day of Therapy: 2  Additional Antimicrobials: levofloxacin    Pertinent Laboratory Values: Wt Readings from Last 1 Encounters:   03/11/22 49.4 kg (109 lb)     Temp Readings from Last 1 Encounters:   03/12/22 99 °F (37.2 °C)     No components found for: PROCAL  Estimated Creatinine Clearance: 41.6 mL/min (based on SCr of 1.01 mg/dL). Recent Labs     03/12/22  0404 03/11/22  0620   WBC 22.7* 6.4       Pertinent Cultures:  Culture Date Source Results   03/11 Blood GNR   03/11 blood GNR   03/11 urine pending   MRSA Nasal Swab: N/A. Non-respiratory infection.     Assessment:  Date/Time Current Dose Concentration Timing of Concentration (h) AUC   03/11 1,000 mg x1 - - -   03/12 1,250 mg x1 6 19 226   Note: Serum concentrations collected for AUC dosing may appear elevated if collected in close proximity to the dose administered, this is not necessarily an indication of toxicity    Plan:  Current dosing regimen is sub-therapeutic  Will give 1,250 mg x1 today, then resume 1,000 mg q24h  No level ordered at this time  Pharmacy will continue to monitor patient and adjust therapy as indicated    Thank you for the consult,  KVNG Mcintosh  3/12/2022

## 2022-03-12 NOTE — ROUTINE PROCESS
Report received from [facility]. Patient is alert, in bed,  Family is at the bedside. No distress noted. 0105  Patient is awake, assisted OOB to bsc to void then back to bed. Call bell is within reach. 4750  Patient is awake, alert, assisted to bathroom then back to bed. Patient c/o back pain. Flexeril given as ordered.

## 2022-03-12 NOTE — PROGRESS NOTES
Subjective-  Patient complain back pain otherwise no new neurological complains.     NEUROLOGIC EXAMINATION    Mental status: Awake, alert, oriented x3, follows simple commands, limited speech, very slow to respond, very difficult to assess. Speech and languge: very slow fluent, naming and repitition intac. Neuro exam remain unchanged. .       IMPRESSION_70 year old female with history of multiple sclerosis diagnosed in 2014 admitted for falls,  confused and brought to hospital, suspected sepsis and started on antibiotics, neurology consulted for multiple falls, patient admitted loosing control in her legs and using walker at home if needed.   Encephalopathy likely related to sepsis/metabolic etiology and contributing to  her falls.   HISTORY OF MS-  Recommend MRI BRAIN -Showed vidence for a moderate burden matter changes     representing combination of patient's clinically diagnosed MS and chronic small  vessel disease as relates to patient's hypertension     No evidence of an acute infarction or bleed is seen.     Slightly more involved right frontal deep white matter probably chronic  demyelination cannot totally exclude PML may wish to consider a short interval  follow-up if old films cannot be located for comparison. Doubt PML, Cervical spine showed no evidence of demyelination but DGD and disc disease  could be contributing to her falls as well.        Pending VIT D, B12 and TSH. Fall precautions  PT and OT for gait safety.     STROKE-  Aspirin and statin  Maximize control of risk factors.     Plan:will follow up as needed.                I spent 20  minutes with the patient in face-to-face consultation, of which greater than 50% was spent in counseling and coordination of care as described above.

## 2022-03-13 LAB
ANION GAP SERPL CALC-SCNC: 6 MMOL/L (ref 3–18)
BACTERIA SPEC CULT: ABNORMAL
BASOPHILS # BLD: 0 K/UL (ref 0–0.1)
BASOPHILS NFR BLD: 0 % (ref 0–2)
BUN SERPL-MCNC: 21 MG/DL (ref 7–18)
BUN/CREAT SERPL: 27 (ref 12–20)
CALCIUM SERPL-MCNC: 8.7 MG/DL (ref 8.5–10.1)
CHLORIDE SERPL-SCNC: 114 MMOL/L (ref 100–111)
CO2 SERPL-SCNC: 24 MMOL/L (ref 21–32)
CREAT SERPL-MCNC: 0.77 MG/DL (ref 0.6–1.3)
DIFFERENTIAL METHOD BLD: ABNORMAL
EOSINOPHIL # BLD: 0 K/UL (ref 0–0.4)
EOSINOPHIL NFR BLD: 0 % (ref 0–5)
ERYTHROCYTE [DISTWIDTH] IN BLOOD BY AUTOMATED COUNT: 16 % (ref 11.6–14.5)
GLUCOSE BLD STRIP.AUTO-MCNC: 125 MG/DL (ref 70–110)
GLUCOSE SERPL-MCNC: 55 MG/DL (ref 74–99)
GRAM STN SPEC: ABNORMAL
GRAM STN SPEC: ABNORMAL
HCT VFR BLD AUTO: 26.7 % (ref 35–45)
HGB BLD-MCNC: 8.2 G/DL (ref 12–16)
IMM GRANULOCYTES # BLD AUTO: 0 K/UL
IMM GRANULOCYTES NFR BLD AUTO: 0 %
LYMPHOCYTES # BLD: 0.7 K/UL (ref 0.9–3.6)
LYMPHOCYTES NFR BLD: 4 % (ref 21–52)
MCH RBC QN AUTO: 29.5 PG (ref 24–34)
MCHC RBC AUTO-ENTMCNC: 30.7 G/DL (ref 31–37)
MCV RBC AUTO: 96 FL (ref 78–100)
MONOCYTES # BLD: 0.2 K/UL (ref 0.05–1.2)
MONOCYTES NFR BLD: 1 % (ref 3–10)
NEUTS BAND NFR BLD MANUAL: 17 % (ref 0–5)
NEUTS SEG # BLD: 17.6 K/UL (ref 1.8–8)
NEUTS SEG NFR BLD: 78 % (ref 40–73)
NRBC # BLD: 0 K/UL (ref 0–0.01)
NRBC BLD-RTO: 0 PER 100 WBC
PLATELET # BLD AUTO: 203 K/UL (ref 135–420)
PLATELET COMMENTS,PCOM: ABNORMAL
PMV BLD AUTO: 11.1 FL (ref 9.2–11.8)
POTASSIUM SERPL-SCNC: 3.3 MMOL/L (ref 3.5–5.5)
RBC # BLD AUTO: 2.78 M/UL (ref 4.2–5.3)
RBC MORPH BLD: ABNORMAL
SERVICE CMNT-IMP: ABNORMAL
SODIUM SERPL-SCNC: 144 MMOL/L (ref 136–145)
WBC # BLD AUTO: 18.5 K/UL (ref 4.6–13.2)

## 2022-03-13 PROCEDURE — 2709999900 HC NON-CHARGEABLE SUPPLY

## 2022-03-13 PROCEDURE — 99232 SBSQ HOSP IP/OBS MODERATE 35: CPT | Performed by: INTERNAL MEDICINE

## 2022-03-13 PROCEDURE — 74011250637 HC RX REV CODE- 250/637: Performed by: INTERNAL MEDICINE

## 2022-03-13 PROCEDURE — 74011250636 HC RX REV CODE- 250/636: Performed by: INTERNAL MEDICINE

## 2022-03-13 PROCEDURE — 74011000250 HC RX REV CODE- 250: Performed by: INTERNAL MEDICINE

## 2022-03-13 PROCEDURE — 85025 COMPLETE CBC W/AUTO DIFF WBC: CPT

## 2022-03-13 PROCEDURE — 87040 BLOOD CULTURE FOR BACTERIA: CPT

## 2022-03-13 PROCEDURE — 74011000258 HC RX REV CODE- 258: Performed by: INTERNAL MEDICINE

## 2022-03-13 PROCEDURE — 36415 COLL VENOUS BLD VENIPUNCTURE: CPT

## 2022-03-13 PROCEDURE — 82962 GLUCOSE BLOOD TEST: CPT

## 2022-03-13 PROCEDURE — 80048 BASIC METABOLIC PNL TOTAL CA: CPT

## 2022-03-13 PROCEDURE — 65660000000 HC RM CCU STEPDOWN

## 2022-03-13 RX ORDER — MAGNESIUM CITRATE
148 SOLUTION, ORAL ORAL
Status: COMPLETED | OUTPATIENT
Start: 2022-03-13 | End: 2022-03-13

## 2022-03-13 RX ORDER — POTASSIUM CHLORIDE 20 MEQ/1
40 TABLET, EXTENDED RELEASE ORAL 3 TIMES DAILY
Status: COMPLETED | OUTPATIENT
Start: 2022-03-13 | End: 2022-03-14

## 2022-03-13 RX ORDER — OXYCODONE AND ACETAMINOPHEN 5; 325 MG/1; MG/1
1 TABLET ORAL
Status: DISCONTINUED | OUTPATIENT
Start: 2022-03-13 | End: 2022-03-15 | Stop reason: HOSPADM

## 2022-03-13 RX ORDER — FACIAL-BODY WIPES
10 EACH TOPICAL
Status: COMPLETED | OUTPATIENT
Start: 2022-03-13 | End: 2022-03-13

## 2022-03-13 RX ORDER — LEVOFLOXACIN 5 MG/ML
750 INJECTION, SOLUTION INTRAVENOUS EVERY 24 HOURS
Status: DISCONTINUED | OUTPATIENT
Start: 2022-03-14 | End: 2022-03-15 | Stop reason: HOSPADM

## 2022-03-13 RX ADMIN — OXYCODONE AND ACETAMINOPHEN 1 TABLET: 5; 325 TABLET ORAL at 16:23

## 2022-03-13 RX ADMIN — THERA TABS 1 TABLET: TAB at 09:16

## 2022-03-13 RX ADMIN — POTASSIUM CHLORIDE 40 MEQ: 20 TABLET, EXTENDED RELEASE ORAL at 15:35

## 2022-03-13 RX ADMIN — POTASSIUM CHLORIDE 40 MEQ: 20 TABLET, EXTENDED RELEASE ORAL at 21:27

## 2022-03-13 RX ADMIN — ATORVASTATIN CALCIUM 80 MG: 40 TABLET, FILM COATED ORAL at 21:28

## 2022-03-13 RX ADMIN — CYCLOBENZAPRINE 10 MG: 10 TABLET, FILM COATED ORAL at 09:17

## 2022-03-13 RX ADMIN — LEVOFLOXACIN 750 MG: 5 INJECTION, SOLUTION INTRAVENOUS at 09:17

## 2022-03-13 RX ADMIN — POTASSIUM CHLORIDE 40 MEQ: 20 TABLET, EXTENDED RELEASE ORAL at 09:16

## 2022-03-13 RX ADMIN — MAGNESIUM CITRATE 148 ML: 1.75 LIQUID ORAL at 15:35

## 2022-03-13 RX ADMIN — CEFEPIME HYDROCHLORIDE 2 G: 2 INJECTION, POWDER, FOR SOLUTION INTRAVENOUS at 21:28

## 2022-03-13 RX ADMIN — METOPROLOL SUCCINATE 100 MG: 100 TABLET, EXTENDED RELEASE ORAL at 09:17

## 2022-03-13 RX ADMIN — LISINOPRIL 40 MG: 20 TABLET ORAL at 09:15

## 2022-03-13 RX ADMIN — BISACODYL 10 MG: 10 SUPPOSITORY RECTAL at 15:35

## 2022-03-13 RX ADMIN — SODIUM CHLORIDE, PRESERVATIVE FREE 10 ML: 5 INJECTION INTRAVENOUS at 15:36

## 2022-03-13 RX ADMIN — SODIUM CHLORIDE, PRESERVATIVE FREE 10 ML: 5 INJECTION INTRAVENOUS at 21:28

## 2022-03-13 RX ADMIN — CEFEPIME HYDROCHLORIDE 2 G: 2 INJECTION, POWDER, FOR SOLUTION INTRAVENOUS at 09:17

## 2022-03-13 RX ADMIN — SODIUM CHLORIDE, PRESERVATIVE FREE 10 ML: 5 INJECTION INTRAVENOUS at 06:30

## 2022-03-13 RX ADMIN — ASPIRIN 325 MG ORAL TABLET 325 MG: 325 PILL ORAL at 21:28

## 2022-03-13 NOTE — PROGRESS NOTES
Problem: Falls - Risk of  Goal: *Absence of Falls  Description: Document Kaela Stanton Fall Risk and appropriate interventions in the flowsheet.   Outcome: Progressing Towards Goal  Note: Fall Risk Interventions:  Mobility Interventions: Bed/chair exit alarm,PT Consult for mobility concerns    Mentation Interventions: Adequate sleep, hydration, pain control,Door open when patient unattended    Medication Interventions: Bed/chair exit alarm,Patient to call before getting OOB    Elimination Interventions: Bed/chair exit alarm,Call light in reach    History of Falls Interventions: Bed/chair exit alarm         Problem: Nutrition Deficit  Goal: *Optimize nutritional status  Outcome: Progressing Towards Goal

## 2022-03-13 NOTE — PROGRESS NOTES
INTERIM UPDATE - 7021 EST on 3/13/2022    Nursing Staff expresses concern for Patient and reports that Patient may have had some \"old, vaginal blood\" that was discovered this AM (or, perhaps, last night). A single dose of Enoxaparin was reportedly held for this reason. Nursing Staff also expresses concern about hemoglobin that was previously Hgb 11.0, but has fallen to Hgb 8.2.

## 2022-03-13 NOTE — PROGRESS NOTES
[facility] Hospitalist Group  Progress Note    Patient: Major Cure Age: 74 y.o. : 1955 MR#: [id number] SSN: [id number]  Date/Time: 3/13/2022     C/C:AMS        Subjective:   HPI : Patient with history of multiple sclerosis multiple CVA, , chronic smoking hypertension hyperlipidemia being admitted with altered mental status, also patient had a fever and lactic acidosis suspecting sepsis patient's blood culture now positive for gram-negative bacilli repeat cultures pending, ID aware. Review of Systems:     -According to patient she is having pains, mainly crampy pains, all over( ?  From MS, ? From constipation, )   -Denies any chest pain shortness of breath no nausea vomiting  -No fever chills tolerating current medications       Assessment/Plan:      1.   Acute metabolic encephalopathy  2 dehydration  3 gram-negative sepsis  4 hypertension  5 small multiple CVAs  6 chronic tobacco abuse   7 history of falls history of possible syncope this admission- SA and statin - follow neuro recs   8 Severe constipation   9 Abnormality in Uterus - on CT abd .   10 Hypokalemia   11 leukocytosis-improving      Plan    -Patient has some drop in H&H which was noticed, this could be secondary to her hydration effect we will continue to monitor H&H    -For constipation-patient refuses Fleet enema so rectal suppository and p.o. laxative ordered-again outpatient GI evaluation for possible colonoscopy endoscopies    -CT abnormality suggestive of intrauterine cystic mass or arterial, patient does have some brownish discharge off and on, patient may need to be referred outpatient to GYN for further management of this issue which requires intravaginal and abdominal uterine ultrasound.      -Acute metabolic encephalopathy secondary to sepsis gram-negative, currently on antibiotics per ID, repeat cultures pending follow CT abdomen pelvis rule out intra-abdominal source for infection     -MRI does not show any new CVA     -Strong recommendation to stop tobacco abuse she understands and she says she will try     -Blood pressure fairly controlled continue current treatment     -ID and neurology consultation appreciated     Objective:         General:  Alert, cooperative, no acute distress   HEENT: No facial asymmetry, CONCHITA Henrik, External ears - WNL    Cardiovascular: S1S2 - regular , No Murmur   Pulmonary: Equal expansion , No Use of accessory muscles , No Rales No Rhonchi    GI:  +BS in all four quadrants, soft, non-tender  Extremities:  No edema; 2+ dorsalis pedis pulses bilaterally  Neuro: Alert and oriented X 2.          DVT Prophylaxis:  [x]Lovenox  []Hep SQ  []SCDs  []Coumadin   []On Heparin gtt     [] Eliquis [] Xarelto          Vitals:         VS:   Visit Vitals  /74 (BP 1 Location: Right upper arm, BP Patient Position: At rest)   Pulse 75   Temp 97.3 °F (36.3 °C)   Resp 18   Ht 5' 5\" (1.651 m)   Wt 51.3 kg (113 lb)   SpO2 97%   BMI 18.80 kg/m²      Tmax/24hrs: Temp (24hrs), Av.5 °F (36.4 °C), Min:96.5 °F (35.8 °C), Max:98 °F (36.7 °C)  IOBRIEF    Intake/Output Summary (Last 24 hours) at 3/13/2022 1426  Last data filed at 3/13/2022 0630  Gross per 24 hour   Intake 340 ml   Output 300 ml   Net 40 ml         Medications:   Current Facility-Administered Medications   Medication Dose Route Frequency    magnesium citrate solution 148 mL  148 mL Oral NOW    potassium chloride (K-DUR, KLOR-CON M20) SR tablet 40 mEq  40 mEq Oral TID    bisacodyL (DULCOLAX) suppository 10 mg  10 mg Rectal NOW    oxyCODONE-acetaminophen (PERCOCET) 5-325 mg per tablet 1 Tablet  1 Tablet Oral Q6H PRN    [START ON 3/14/2022] levoFLOXacin (LEVAQUIN) 750 mg in D5W IVPB  750 mg IntraVENous Q24H    cefepime (MAXIPIME) 2 g in 0.9% sodium chloride (MBP/ADV) 100 mL MBP  2 g IntraVENous Q12H    sodium chloride (NS) flush 5-10 mL  5-10 mL IntraVENous PRN    aspirin tablet 325 mg  325 mg Oral QHS    clonazePAM (KlonoPIN) tablet 0.5 mg  0.5 mg Oral TID PRN    cyclobenzaprine (FLEXERIL) tablet 10 mg  10 mg Oral Q8H PRN    lisinopriL (PRINIVIL, ZESTRIL) tablet 40 mg  40 mg Oral DAILY    therapeutic multivitamin (THERAGRAN) tablet 1 Tablet  1 Tablet Oral DAILY    atorvastatin (LIPITOR) tablet 80 mg  80 mg Oral QHS    sodium chloride (NS) flush 5-40 mL  5-40 mL IntraVENous Q8H    sodium chloride (NS) flush 5-40 mL  5-40 mL IntraVENous PRN    acetaminophen (TYLENOL) tablet 650 mg  650 mg Oral Q6H PRN    Or    acetaminophen (TYLENOL) suppository 650 mg  650 mg Rectal Q6H PRN    polyethylene glycol (MIRALAX) packet 17 g  17 g Oral DAILY PRN    promethazine (PHENERGAN) tablet 12.5 mg  12.5 mg Oral Q6H PRN    Or    ondansetron (ZOFRAN) injection 4 mg  4 mg IntraVENous Q6H PRN    [Held by provider] enoxaparin (LOVENOX) injection 40 mg  40 mg SubCUTAneous DAILY    metoprolol succinate (TOPROL-XL) tablet 100 mg  100 mg Oral DAILY       Labs:    Recent Labs     03/13/22  0128 03/12/22  0404 03/11/22  0620   WBC 18.5* 22.7* 6.4   HGB 8.2* 8.4* 11.3*   HCT 26.7* 27.4* 36.3    222 262     Recent Labs     03/13/22  0128 03/12/22  0404 03/11/22  0620    142 143   K 3.3* 3.2* 4.1   * 113* 111   CO2 24 23 25   GLU 55* 72* 98   BUN 21* 19* 17   CREA 0.77 1.01 1.07   CA 8.7 8.8 9.1   ALB  --   --  2.7*   ALT  --   --  16         Time spent on direct patient care >30 mints     Complexity : High complex - due to multiple medical issues outlined above. CODE Status : full code     Case discussed with:  [x]Patient  [] Family  []Nursing  []Case Management       Disclaimer: Sections of this note are dictated utilizing voice recognition software, which may have resulted in some phonetic based errors in grammar and contents. Even though attempts were made to correct all the mistakes, some may have been missed, and remained in the body of the document. If questions arise, please contact our department.     Signed By: Marylu Glass Conor Parson MD     March 13, 2022

## 2022-03-13 NOTE — PROGRESS NOTES
Bedside and Verbal shift change report given to YAZMIN Macias (oncoming nurse) by Brayan Pete RN (offgoing nurse). Report included the following information SBAR, Kardex, Intake/Output, MAR and Recent Results.

## 2022-03-14 LAB
ANION GAP SERPL CALC-SCNC: 1 MMOL/L (ref 3–18)
BACTERIA SPEC CULT: ABNORMAL
BACTERIA SPEC CULT: ABNORMAL
BASOPHILS # BLD: 0.1 K/UL (ref 0–0.1)
BASOPHILS NFR BLD: 0 % (ref 0–2)
BUN SERPL-MCNC: 19 MG/DL (ref 7–18)
BUN/CREAT SERPL: 22 (ref 12–20)
CALCIUM SERPL-MCNC: 8.9 MG/DL (ref 8.5–10.1)
CHLORIDE SERPL-SCNC: 113 MMOL/L (ref 100–111)
CO2 SERPL-SCNC: 27 MMOL/L (ref 21–32)
CREAT SERPL-MCNC: 0.87 MG/DL (ref 0.6–1.3)
DIFFERENTIAL METHOD BLD: ABNORMAL
EOSINOPHIL # BLD: 0.3 K/UL (ref 0–0.4)
EOSINOPHIL NFR BLD: 3 % (ref 0–5)
ERYTHROCYTE [DISTWIDTH] IN BLOOD BY AUTOMATED COUNT: 16.2 % (ref 11.6–14.5)
GLUCOSE SERPL-MCNC: 84 MG/DL (ref 74–99)
GRAM STN SPEC: ABNORMAL
GRAM STN SPEC: ABNORMAL
HCT VFR BLD AUTO: 29.3 % (ref 35–45)
HGB BLD-MCNC: 9.1 G/DL (ref 12–16)
IMM GRANULOCYTES # BLD AUTO: 0.1 K/UL (ref 0–0.04)
IMM GRANULOCYTES NFR BLD AUTO: 1 % (ref 0–0.5)
LYMPHOCYTES # BLD: 1.4 K/UL (ref 0.9–3.6)
LYMPHOCYTES NFR BLD: 11 % (ref 21–52)
MCH RBC QN AUTO: 29.6 PG (ref 24–34)
MCHC RBC AUTO-ENTMCNC: 31.1 G/DL (ref 31–37)
MCV RBC AUTO: 95.4 FL (ref 78–100)
MONOCYTES # BLD: 0.5 K/UL (ref 0.05–1.2)
MONOCYTES NFR BLD: 4 % (ref 3–10)
NEUTS SEG # BLD: 10.3 K/UL (ref 1.8–8)
NEUTS SEG NFR BLD: 82 % (ref 40–73)
NRBC # BLD: 0 K/UL (ref 0–0.01)
NRBC BLD-RTO: 0 PER 100 WBC
PLATELET # BLD AUTO: 201 K/UL (ref 135–420)
PMV BLD AUTO: 11.2 FL (ref 9.2–11.8)
POTASSIUM SERPL-SCNC: 5.4 MMOL/L (ref 3.5–5.5)
RBC # BLD AUTO: 3.07 M/UL (ref 4.2–5.3)
SERVICE CMNT-IMP: ABNORMAL
SODIUM SERPL-SCNC: 141 MMOL/L (ref 136–145)
WBC # BLD AUTO: 12.6 K/UL (ref 4.6–13.2)

## 2022-03-14 PROCEDURE — 36415 COLL VENOUS BLD VENIPUNCTURE: CPT

## 2022-03-14 PROCEDURE — 97166 OT EVAL MOD COMPLEX 45 MIN: CPT

## 2022-03-14 PROCEDURE — 74011250636 HC RX REV CODE- 250/636: Performed by: INTERNAL MEDICINE

## 2022-03-14 PROCEDURE — 74011000250 HC RX REV CODE- 250: Performed by: INTERNAL MEDICINE

## 2022-03-14 PROCEDURE — 99232 SBSQ HOSP IP/OBS MODERATE 35: CPT | Performed by: INTERNAL MEDICINE

## 2022-03-14 PROCEDURE — 97535 SELF CARE MNGMENT TRAINING: CPT

## 2022-03-14 PROCEDURE — 74011250637 HC RX REV CODE- 250/637: Performed by: INTERNAL MEDICINE

## 2022-03-14 PROCEDURE — 97162 PT EVAL MOD COMPLEX 30 MIN: CPT

## 2022-03-14 PROCEDURE — 85025 COMPLETE CBC W/AUTO DIFF WBC: CPT

## 2022-03-14 PROCEDURE — 80048 BASIC METABOLIC PNL TOTAL CA: CPT

## 2022-03-14 PROCEDURE — 65660000000 HC RM CCU STEPDOWN

## 2022-03-14 PROCEDURE — 74011000258 HC RX REV CODE- 258: Performed by: INTERNAL MEDICINE

## 2022-03-14 PROCEDURE — 2709999900 HC NON-CHARGEABLE SUPPLY

## 2022-03-14 PROCEDURE — 97530 THERAPEUTIC ACTIVITIES: CPT

## 2022-03-14 RX ORDER — MAGNESIUM SULFATE 100 %
4 CRYSTALS MISCELLANEOUS AS NEEDED
Status: DISCONTINUED | OUTPATIENT
Start: 2022-03-14 | End: 2022-03-15 | Stop reason: HOSPADM

## 2022-03-14 RX ORDER — DEXTROSE MONOHYDRATE 100 MG/ML
0-250 INJECTION, SOLUTION INTRAVENOUS AS NEEDED
Status: DISCONTINUED | OUTPATIENT
Start: 2022-03-14 | End: 2022-03-15 | Stop reason: HOSPADM

## 2022-03-14 RX ADMIN — SODIUM CHLORIDE, PRESERVATIVE FREE 10 ML: 5 INJECTION INTRAVENOUS at 15:42

## 2022-03-14 RX ADMIN — LEVOFLOXACIN 750 MG: 5 INJECTION, SOLUTION INTRAVENOUS at 10:23

## 2022-03-14 RX ADMIN — LISINOPRIL 40 MG: 20 TABLET ORAL at 08:41

## 2022-03-14 RX ADMIN — SODIUM CHLORIDE, PRESERVATIVE FREE 10 ML: 5 INJECTION INTRAVENOUS at 22:38

## 2022-03-14 RX ADMIN — CEFEPIME HYDROCHLORIDE 2 G: 2 INJECTION, POWDER, FOR SOLUTION INTRAVENOUS at 08:41

## 2022-03-14 RX ADMIN — SODIUM CHLORIDE, PRESERVATIVE FREE 10 ML: 5 INJECTION INTRAVENOUS at 06:31

## 2022-03-14 RX ADMIN — METOPROLOL SUCCINATE 100 MG: 100 TABLET, EXTENDED RELEASE ORAL at 08:40

## 2022-03-14 RX ADMIN — OXYCODONE AND ACETAMINOPHEN 1 TABLET: 5; 325 TABLET ORAL at 18:06

## 2022-03-14 RX ADMIN — THERA TABS 1 TABLET: TAB at 08:41

## 2022-03-14 RX ADMIN — ASPIRIN 325 MG ORAL TABLET 325 MG: 325 PILL ORAL at 22:37

## 2022-03-14 RX ADMIN — ATORVASTATIN CALCIUM 80 MG: 40 TABLET, FILM COATED ORAL at 22:37

## 2022-03-14 RX ADMIN — POTASSIUM CHLORIDE 40 MEQ: 20 TABLET, EXTENDED RELEASE ORAL at 08:41

## 2022-03-14 RX ADMIN — OXYCODONE AND ACETAMINOPHEN 1 TABLET: 5; 325 TABLET ORAL at 10:27

## 2022-03-14 NOTE — PROGRESS NOTES
CM called patient's daughter Manuela Hernandez [phone] to try to complete an initial assessment. CM received voicemail, left message for return call, phone number given.            Katt Balderrama RN  Case Management [phone]

## 2022-03-14 NOTE — PROGRESS NOTES
Reason for Admission:  Acute sepsis ([facility] [A41.9]  Fall [W19. XXXA]  Community acquired bacterial pneumonia [J15.9]  Sepsis ([facility] [A41.9]                 RUR Score:    14%            Plan for utilizing home health:    Yes, FOC verbal consent given for Any accepting home health agency. Likelihood of Readmission:   LOW                         Transition of Care Plan:              Initial assessment completed with relative(s), patient's daughter Eleonora Anderson. Cognitive status of patient: Alert and orientated times 2 per documented notes. Face sheet information confirmed:  yes. The patient's daughter Eleonora Anderson [phone] agrees to participate in her discharge plan and to receive any needed information. This patient lives in a single family home with her daughter and family, with 3 steps to enter. Patient is able to navigate steps as needed. Prior to hospitalization, patient was considered to be independent with ADLs/IADLS : yes . Patient has a current ACP document on file: no.      Healthcare Decision Maker:     Click here to complete [address] including selection of the Healthcare Decision Maker Relationship (ie \"Primary\")    The patient's daughter will be available to transport patient home upon discharge. The patient already has none reported,  medical equipment available in the home. Patient is not currently active with home health. Patient has not stayed in a skilled nursing facility or rehab. This patient is on dialysis :no.      List of available Home Health agencies were provided and reviewed with the patient prior to discharge. [place] of choice verbal consent given: yes, for Any accepting home health agency. Currently, the discharge plan is Home with [address] [place] [address] Jose Griggs. The patient states that she can obtain her medications from the pharmacy, and take her medications as directed.     Patient's current insurance is [employer]. Care Management Interventions  PCP Verified by CM:  Yes  Mode of Transport at Discharge: Self (Patient's daughter will be transporting patient home at time of discharge. )  Transition of Care Consult (CM Consult): [address]: Yes  Discharge Durable Medical Equipment: No  Physical Therapy Consult: Yes  Occupational Therapy Consult: Yes  Speech Therapy Consult: No  Support Systems: Child(yazmin) (Patient lives with her daughter.)  Confirm Follow Up Transport: Family  The Plan for Transition of Care is Related to the Following Treatment Goals : Home with [address] [place], [place] [address]josy  The Patient and/or Patient Representative was Provided with a Choice of Provider and Agrees with the Discharge Plan?: Yes  Name of the Patient Representative Who was Provided with a Choice of Provider and Agrees with the Discharge Plan: Ben Perales (Patient's daughter)  Freedom of Choice List was Provided with Basic Dialogue that Supports the Patient's Individualized Plan of Care/Goals, Treatment Preferences and Shares the Quality Data Associated with the Providers?: Yes  Discharge Location  Patient Expects to be Discharged to[de-identified] Home with home health        Melissa Martin RN  Case Management [phone]

## 2022-03-14 NOTE — PROGRESS NOTES
Problem: Self Care Deficits Care Plan (Adult)  Goal: *Acute Goals and Plan of Care (Insert Text)  Description: Occupational Therapy Goals  Initiated 3/14/2022 within 7 day(s). 1.  Patient will perform grooming with modified independence in stance at sink using RW with < 2 seated rest breaks. 2.  Patient will perform bathing with modified independence using adaptive equipment as needed. 3.  Patient will perform upper body dressing and lower body dressing with modified independence using adaptive equipment as needed. 4.  Patient will perform toilet transfers with modified independence using RW with good balance. 5.  Patient will perform all aspects of toileting with modified independence. 6.  Patient will participate in upper extremity therapeutic exercise/activities with modified independence for 8 minutes. 7.  Patient will utilize energy conservation techniques during functional activities with min verbal cues. Prior Level of Function: mod I with ADLs and functional mobility w/o AD until ~ 1 month ago, became weaker     Outcome: Progressing Towards Goal   OCCUPATIONAL THERAPY EVALUATION    Patient: Mauricio Mcclellan (54 y.o. female)  Date: 3/14/2022  Primary Diagnosis: Acute sepsis ([facility] [A41.9]  Fall [W19. XXXA]  Community acquired bacterial pneumonia [J15.9]  Sepsis ([facility] [A41.9]        Precautions:   Fall  PLOF: mod I with ADLs and functional mobility w/o AD until ~ 1 month ago, became weaker     ASSESSMENT :  Nursing/RN cleared for pt to participate in OT evaluation and tx session. Patient presents lying semi-reclined in bed, A & O x 4, no c/o pain, reports weakness in BLEs. Bed mobility: SBA supine <-> sit edge of bed. Bedside commode transfer: CGA using RW, vc's for correct hand placement, CGA toilet tasks. Nursing notified of LUE IV line discomfort with noted fluid leaking down arm.  Patient lying semi reclined in bed, call bell within reach & pt verbalized understanding and provided return demonstration to utilize for assist e.g. functional transfers in order to prevent falls. Patient will benefit from skilled intervention to address the above impairments. Patient's rehabilitation potential is considered to be Good  Factors which may influence rehabilitation potential include:   []             None noted  []             Mental ability/status  [x]             Medical condition  []             Home/family situation and support systems  []             Safety awareness  []             Pain tolerance/management  []             Other:      PLAN :  Recommendations and Planned Interventions:   [x]               Self Care Training                  [x]      Therapeutic Activities  [x]               Functional Mobility Training   []      Cognitive Retraining  [x]               Therapeutic Exercises           [x]      Endurance Activities  [x]               Balance Training                    [x]      Neuromuscular Re-Education  []               Visual/Perceptual Training     [x]      Home Safety Training  [x]               Patient Education                   [x]      Family Training/Education  []               Other (comment):    Frequency/Duration: Patient will be followed by occupational therapy 3-5 times a week to address goals.   Discharge Recommendations: Rehab  Further Equipment Recommendations for Discharge: bedside commode, shower chair, and rolling walker     SUBJECTIVE:   Patient stated Jackie Quintana    OBJECTIVE DATA SUMMARY:     Past Medical History:   Diagnosis Date    Autoimmune disease ([facility]     MS    Gross hematuria     Hypertension     Kidney stones     MS (congenital mitral stenosis)     Stroke (cerebrum) ([facility]      Past Surgical History:   Procedure Laterality Date    HX UROLOGICAL  12/2016    stent placement     Barriers to Learning/Limitations: None  Compensate with: visual, verbal, tactile, kinesthetic cues/model    Home Situation:   Home Situation  Home Environment: Private residence  # Steps to Enter: 3  Rails to Enter: Yes  Hand Rails : Bilateral  One/Two Story Residence: Two story  # of Interior Steps: 15  Interior Rails: Left  Living Alone: No  Support Systems: Child(yazmin)  Current DME Used/Available at Home: None  Tub or Shower Type: Tub/Shower combination  []  Right hand dominant   []  Left hand dominant    Cognitive/Behavioral Status:  Neurologic State: Alert  Orientation Level: Oriented X4  Cognition: Follows commands  Safety/Judgement: Fall prevention    Skin: appears intact  Edema: none noted    Vision/Perceptual:  appears intact, able to tell correct time on wall clock       Corrective Lenses: Glasses    Coordination: BUE  Coordination: Generally decreased, functional  Fine Motor Skills-Upper: Left Intact; Right Intact    Gross Motor Skills-Upper: Left Intact; Right Intact    Balance:  Sitting: Impaired  Sitting - Static: Good (unsupported)  Sitting - Dynamic: Fair (occasional)  Standing: Intact; With support  Standing - Static: Fair  Standing - Dynamic : Fair (-)    Strength: BUE  Strength: Generally decreased, functional     Tone & Sensation: BUE  Tone: Normal  Sensation: Intact     Range of Motion: BUE  AROM: Within functional limits     Functional Mobility and Transfers for ADLs:  Bed Mobility:     Supine to Sit: Stand-by assistance  Sit to Supine: Stand-by assistance  Scooting: Stand-by assistance  Transfers:  Sit to Stand: Contact guard assistance  Stand to Sit: Contact guard assistance      Toilet Transfer : Contact guard assistance     ADL Assessment:   Feeding: Modified independent;Setup    Oral Facial Hygiene/Grooming: Stand-by assistance    Bathing:  Moderate assistance    Upper Body Dressing: Contact guard assistance    Lower Body Dressing: Maximum assistance    Toileting: Contact guard assistance    ADL Intervention:  Toileting  Bladder Hygiene: Contact guard assistance    Cognitive Retraining  Safety/Judgement: Fall prevention    Pain:  Pain level pre-treatment: 0/10   Pain level post-treatment: 0/10     Activity Tolerance:   poor  Please refer to the flowsheet for vital signs taken during this treatment. After treatment:   [] Patient left in no apparent distress sitting up in chair  [x] Patient left in no apparent distress in bed  [x] Call bell left within reach  [x] Nursing notified  [] Caregiver present  [x] Bed alarm activated    COMMUNICATION/EDUCATION:   [x] Role of Occupational Therapy in the acute care setting  [x] Home safety education was provided and the patient/caregiver indicated understanding. [x] Patient/family have participated as able in goal setting and plan of care. [x] Patient/family agree to work toward stated goals and plan of care. [] Patient understands intent and goals of therapy, but is neutral about his/her participation. [] Patient is unable to participate in goal setting and plan of care. Thank you for this referral.  Leonel Cifuentes  Time Calculation: 20 mins    Eval Complexity: History: MEDIUM Complexity : Expanded review of history including physical, cognitive and psychosocial  history ; Examination: MEDIUM Complexity : 3-5 performance deficits relating to physical, cognitive , or psychosocial skils that result in activity limitations and / or participation restrictions; Decision Making:MEDIUM Complexity : Patient may present with comorbidities that affect occupational performnce.  Miniml to moderate modification of tasks or assistance (eg, physical or verbal ) with assesment(s) is necessary to enable patient to complete evaluation

## 2022-03-14 NOTE — PROGRESS NOTES
Infectious Disease progress Note        Reason: Evaluate for sepsis    Current abx Prior abx   Levofloxacin since 3/11  Cefepime since 3/11 Cefepime on 3/11  vancomycin 3/11-3/12     Lines:       Assessment :    74 y.o. female female with past medical history of multiple sclerosis, hypertension, renal stones, stroke presented to [facility] ED  on 3/11 with altered mental status, status post fall. Fully vaccinated for COVID-19. Booster vaccine in November 2021    Now with low-grade fever, poor appetite for about a week, altered mentation    clinical presentation c/w  Sepsis (POA) due to proteus, e.coli bloodstream infection-positive blood culture 3/11, negative blood culture 3/13    Proteus/E. coli bloodstream infection likely secondary to recent constipation    Lack of significant pyuria argues against cystitis. Urine culture 3/11-15,000 colonies of organisms likely colonized  Significant stool noted throughout the colon on CT scan 3/12/2022 confirms clinical suspicion of constipation related bloodstream infection. No evidence of cholecystitis noted on CT scan or ultrasound    ? Left adnexal mass : CT findings 3/12/2022 concerning for left adnexal mass. Patient complains of brownish drainage per vagina since the past couple days. Negative covid-19 pcr 3/11/22    Altered mental status-likely metabolic encephalopathy. Improving. Still not at baseline per family at bedside    Antibiotic management complicated due to documented history of penicillin allergy-hives per report. Unable to confirm nature of allergic reaction due to patient's altered mentation. Tolerating cefepime without difficulties    Cynical better. Improved mentation    Recommendations:    1. Continue levofloxacin. Discontinue cefepime  2. Follow-up   Repeat blood culture 3/13/2022  3. Recommend outpatient GYN follow-up to evaluate for possible adnexal mass, vaginal discharge  4. Monitor clinically.      Above plan was discussed in details with  Lisa Rivera, daughter at bedside. please call me if any further questions or concerns. Will continue to participate in the care of this patient. HPI:    Feels better. Denies increasing abdominal pain. Had frequent bowel movements yesterday. Complains of brownish liquid coming from vagina. Does not think it stool. States that it is coming from the vagina    Past Medical History:   Diagnosis Date    Autoimmune disease ([facility] [address]     MS    Gross hematuria     Hypertension     Kidney stones     MS (congenital mitral stenosis)     Stroke (cerebrum) ([facility] [address]        Past Surgical History:   Procedure Laterality Date    HX UROLOGICAL  12/2016    stent placement       Current Discharge Medication List      CONTINUE these medications which have NOT CHANGED    Details   metoprolol succinate (TOPROL-XL) 100 mg tablet Take 100 mg by mouth daily. atorvastatin (LIPITOR) 80 mg tablet Take 80 mg by mouth nightly. lisinopriL (PRINIVIL, ZESTRIL) 40 mg tablet Take 40 mg by mouth daily. cyclobenzaprine (FLEXERIL) 10 mg tablet Take 10 mg by mouth every eight (8) hours as needed for Muscle Spasm(s). clonazePAM (KLONOPIN) 0.5 mg tablet Take 0.5 mg by mouth three (3) times daily as needed (tremor). multivitamin (ONE A DAY) tablet Take 1 Tab by mouth daily. aspirin (ASPIRIN) 325 mg tablet Take 325 mg by mouth nightly.          STOP taking these medications       simvastatin (ZOCOR) 20 mg tablet Comments:   Reason for Stopping:               Current Facility-Administered Medications   Medication Dose Route Frequency    potassium chloride (K-DUR, KLOR-CON M20) SR tablet 40 mEq  40 mEq Oral TID    oxyCODONE-acetaminophen (PERCOCET) 5-325 mg per tablet 1 Tablet  1 Tablet Oral Q6H PRN    levoFLOXacin (LEVAQUIN) 750 mg in D5W IVPB  750 mg IntraVENous Q24H    cefepime (MAXIPIME) 2 g in 0.9% sodium chloride (MBP/ADV) 100 mL MBP  2 g IntraVENous Q12H    sodium chloride (NS) flush 5-10 mL  5-10 mL IntraVENous PRN    aspirin tablet 325 mg  325 mg Oral QHS    clonazePAM (KlonoPIN) tablet 0.5 mg  0.5 mg Oral TID PRN    cyclobenzaprine (FLEXERIL) tablet 10 mg  10 mg Oral Q8H PRN    lisinopriL (PRINIVIL, ZESTRIL) tablet 40 mg  40 mg Oral DAILY    therapeutic multivitamin (THERAGRAN) tablet 1 Tablet  1 Tablet Oral DAILY    atorvastatin (LIPITOR) tablet 80 mg  80 mg Oral QHS    sodium chloride (NS) flush 5-40 mL  5-40 mL IntraVENous Q8H    sodium chloride (NS) flush 5-40 mL  5-40 mL IntraVENous PRN    acetaminophen (TYLENOL) tablet 650 mg  650 mg Oral Q6H PRN    Or    acetaminophen (TYLENOL) suppository 650 mg  650 mg Rectal Q6H PRN    polyethylene glycol (MIRALAX) packet 17 g  17 g Oral DAILY PRN    promethazine (PHENERGAN) tablet 12.5 mg  12.5 mg Oral Q6H PRN    Or    ondansetron (ZOFRAN) injection 4 mg  4 mg IntraVENous Q6H PRN    [Held by provider] enoxaparin (LOVENOX) injection 40 mg  40 mg SubCUTAneous DAILY    metoprolol succinate (TOPROL-XL) tablet 100 mg  100 mg Oral DAILY       Allergies: Codeine, Interferon beta-1a, and Penicillins    History reviewed. No pertinent family history.   Social History     Socioeconomic History    Marital status:      Spouse name: Not on file    Number of children: Not on file    Years of education: Not on file    Highest education level: Not on file   Occupational History    Not on file   Tobacco Use    Smoking status: Former Smoker    Smokeless tobacco: Never Used   Substance and Sexual Activity    Alcohol use: Yes     Comment: socially    Drug use: No    Sexual activity: Not on file   Other Topics Concern    Not on file   Social History Narrative    Not on file     Social Determinants of Health     Financial Resource Strain:     Difficulty of Paying Living Expenses: Not on file   Food Insecurity:     Worried About [employer] in the Last Year: Not on file    Lionel of Food in the Last Year: Not on file   Transportation Needs:     Lack of Transportation (Medical): Not on file    Lack of Transportation (Non-Medical): Not on file   Physical Activity:     Days of Exercise per Week: Not on file    Minutes of Exercise per Session: Not on file   Stress:     Feeling of Stress : Not on file   Social Connections:     Frequency of Communication with Friends and Family: Not on file    Frequency of Social Gatherings with Friends and Family: Not on file    Attends Confucianism Services: Not on file    Active Member of [address] [place] [address] or Organizations: Not on file    Attends Club or Organization Meetings: Not on file    Marital Status: Not on file   Intimate Partner Violence:     Fear of Current or Ex-Partner: Not on file    Emotionally Abused: Not on file    Physically Abused: Not on file    Sexually Abused: Not on file   Housing Stability:     Unable to Pay for Housing in the Last Year: Not on file    Number of [place] in the Last Year: Not on file    Unstable Housing in the Last Year: Not on file     Social History     Tobacco Use   Smoking Status Former Smoker   Smokeless Tobacco Never Used        Temp (24hrs), Av.3 °F (36.3 °C), Min:96.5 °F (35.8 °C), Max:97.6 °F (36.4 °C)    Visit Vitals  BP (!) 161/77 (BP 1 Location: Right upper arm, BP Patient Position: At rest)   Pulse 72   Temp 97.4 °F (36.3 °C)   Resp 16   Ht 5' 5\" (1.651 m)   Wt 51.3 kg (113 lb)   SpO2 100%   BMI 18.80 kg/m²       ROS: 12 point review systems obtained details, pertinent positives mentioned in HPI, otherwise negative    Physical Exam:    General: Well developed, well nourished female laying on the bed AAOx3 in no acute distress.     General:   awake alert and oriented   HEENT:  Normocephalic, atraumatic,EOMI, no scleral icterus or pallor; no conjunctival hemmohage;  nasal and oral mucous are moist and without evidence of lesions. Filled dental cavities, missing maxillary teeth neck supple, no bruits.    Lymph Nodes:   no cervical, axillary or inguinal adenopathy   Lungs: non-labored, bilaterally clear to auscultation- no crackles wheezes rales or rhonchi   Heart:  RRR, s1 and s2; no  rubs or gallops, no edema, + pedal pulses   Abdomen:  soft, non-distended, active bowel sounds, no hepatomegaly, no splenomegaly. Non-tender   Genitourinary:  deferred   Extremities:   no clubbing, cyanosis; no joint effusions or swelling; Full ROM of all large joints to the upper and lower extremities; muscle mass appropriate for age   Neurologic:  No gross focal sensory abnormalities; 5/5 muscle strength to upper and lower extremities. Speech appropriate.  Cranial nerves intact                        Skin:  No rash or ulcers noted   Back:  no spinal or paraspinal muscle tenderness or rigidity, no CVA tenderness      Psychiatric:  No suicidal or homicidal ideations, appropriate mood and affect          Labs: Results:   Chemistry Recent Labs     03/14/22 0432 03/13/22  0128 03/12/22  0404   GLU 84 55* 72*    144 142   K 5.4 3.3* 3.2*   * 114* 113*   CO2 27 24 23   BUN 19* 21* 19*   CREA 0.87 0.77 1.01   CA 8.9 8.7 8.8   AGAP 1* 6 6   BUCR 22* 27* 19      CBC w/Diff Recent Labs     03/14/22  0432 03/13/22  0128 03/12/22  0404   WBC 12.6 18.5* 22.7*   RBC 3.07* 2.78* 2.85*   HGB 9.1* 8.2* 8.4*   HCT 29.3* 26.7* 27.4*    203 222   GRANS 82* 78* 93*   LYMPH 11* 4* 4*   EOS 3 0 0      Microbiology Recent Labs     03/13/22  0740 03/11/22  0830 03/11/22  0815 03/11/22  0730   CULT NO GROWTH AFTER 22 HOURS ESCHERICHIA COLI GROWING IN BOTH BOTTLES DRAWN SITE NOT INDICATED*  PROTEUS MIRABILIS GROWING IN 1 OF 2 BOTTLES DRAWN VERIFYING SENSITIVITIES* ESCHERICHIA COLI GROWING IN BOTH BOTTLES DRAWN SITE NOT INDICATED*  PROTEUS MIRABILIS GROWING IN 1 OF 2 BOTTLES DRAWN CORRECTED ON 03/13 AT 0650: PREVIOUSLY REPORTED AS PROTEUS SPECIES GROWING IN 1 OF 2 BOTTLES DRAWN*  REFER TO [id number] FOR SENSITIVITIES MIXED UROGENITAL FAWAD ISOLATED          RADIOLOGY:    All available imaging studies/reports in connect care for this admission were reviewed    High complexity decision making was performed during the evaluation of this patient at high risk for decompensation with multiple organ involvement       Disclaimer: Sections of this note are dictated utilizing voice recognition software, which may have resulted in some phonetic based errors in grammar and contents. Even though attempts were made to correct all the mistakes, some may have been missed, and remained in the body of the document. If questions arise, please contact our department.     Dr. Demetrius Maza, Infectious Disease Specialist  [phone]  March 14, 2022  11:18 AM

## 2022-03-14 NOTE — PROGRESS NOTES
CM spoke with patient's daughter Sharon McMullen [phone], said patient will refuse to go to SNF and Rehab, she said she already spoke with Doctor, and patient will go home with home health. Patient's daughter said they will transport patient home at time of discharge. Larissa Suero for home health orders when possible.              Emilie Vazquez, RN  Case Management [phone]

## 2022-03-14 NOTE — PROGRESS NOTES
Bedside and Verbal shift change report given to Stan Matt RN (oncoming nurse) by Teja Thomas RN (offgoing nurse). Report included the following information SBAR, Kardex, Intake/Output, MAR and Recent Results.

## 2022-03-14 NOTE — PROGRESS NOTES
Problem: Mobility Impaired (Adult and Pediatric)  Goal: *Acute Goals and Plan of Care (Insert Text)  Description: Physical Therapy Goals  Initiated 3/14/2022 and to be accomplished within 7 day(s)  1. Patient will move from supine to sit and sit to supine  in bed with modified independence. 2.  Patient will transfer from bed to chair and chair to bed with modified independence using the least restrictive device. 3.  Patient will perform sit to stand with modified independence. 4.  Patient will ambulate with modified independence for 150 feet with the least restrictive device. 5.  Patient will ascend/descend 4 stairs with 1 handrail(s) with minimal assistance/contact guard assist.    PLOF: independent with mobility without an AD except on \"\"bad days\" then used a single point cane; lives alone; 2 story home with bedroom on 2nd floor     Outcome: Progressing Towards Goal   PHYSICAL THERAPY EVALUATION    Patient: Arabella Serrano (25 y.o. female)  Date: 3/14/2022  Primary Diagnosis: Acute sepsis ([facility] [address] [A41.9]  Fall [W19. XXXA]  Community acquired bacterial pneumonia [J15.9]  Sepsis ([facility] [address] [A41.9]        Precautions: fall       PLOF: see above    ASSESSMENT :  Based on the objective data described below, the patient presents with decreased strength, balance and activity tolerance resulting in decreased independence with functional mobility. Pt occasionally required increased time to follow commands during evaluation. Pt transferred supine to sit with supervision and use of the bed rail. Pt demonstrated good static sitting balance at the edge of the bed. Pt stood with SBA and ambulated 60 feet with RW and CGA with short shuffling steps. When returning to supine in bed patient used UEs to help raise her legs into the bed. Pt required single step, step-by-step commands to scoot her body into the middle of the bed. Pt was left in bed with needs in reach and her sister present.     Patient will benefit from skilled intervention to address the above impairments. Patient's rehabilitation potential is considered to be Good  Factors which may influence rehabilitation potential include:   []         None noted  [x]         Mental ability/status  [x]         Medical condition  [x]         Home/family situation and support systems  []         Safety awareness  []         Pain tolerance/management  []         Other:      PLAN :  Recommendations and Planned Interventions:   [x]           Bed Mobility Training             [x]    Neuromuscular Re-Education  [x]           Transfer Training                   []    Orthotic/Prosthetic Training  [x]           Gait Training                          []    Modalities  [x]           Therapeutic Exercises           []    Edema Management/Control  [x]           Therapeutic Activities            []    Family Training/Education  [x]           Patient Education  []           Other (comment):    Frequency/Duration: Patient will be followed by physical therapy 1-2 times per day/4-7 days per week to address goals. Discharge Recommendations: Rehab  Further Equipment Recommendations for Discharge: N/A     SUBJECTIVE:   Patient stated My legs feel so heavy and tired.     OBJECTIVE DATA SUMMARY:     Past Medical History:   Diagnosis Date    Autoimmune disease ([facility] [address]     MS    Gross hematuria     Hypertension     Kidney stones     MS (congenital mitral stenosis)     Stroke (cerebrum) ([facility] [address]      Past Surgical History:   Procedure Laterality Date    HX UROLOGICAL  12/2016    stent placement     Barriers to Learning/Limitations: None  Compensate with: N/A  Home Situation:   Pt reports living alone in 2 story home with bedroom on 2nd floor  Critical Behavior:  Neurologic State: Alert;Eyes open spontaneously  Orientation Level: Oriented X4  Cognition: Follows commands     Strength:    Strength:  (grossly 2+/5 B hips, 3+/5 knees)      Tone & Sensation:   Tone: Normal   Sensation: Impaired       Range Of Motion:  AROM: Grossly decreased, non-functional      Functional Mobility:  Bed Mobility:  Supine to Sit: Supervision; Additional time (bed rail use)  Sit to Supine: Supervision; Additional time     Transfers:  Sit to Stand: Stand-by assistance  Stand to Sit: Stand-by assistance     Balance:   Sitting - Static: Good (unsupported)  Standing: With support  Standing - Static:  (fair+)  Standing - Dynamic :  (fair/fair-)  Ambulation/Gait Training:  Distance (ft): 60 Feet (ft)  Assistive Device: Walker, rolling  Ambulation - Level of Assistance: Contact guard assistance  Gait Abnormalities: Decreased step clearance  Step Length: Right shortened;Left shortened  Therapeutic Exercises: Ankle pumps x10, heel slides x3, SLR with assistance x3  Pain:  Pain level pre-treatment: 0/10   Pain level post-treatment: 0/10   Pain Intervention(s) : n/a    Activity Tolerance:   Poor+  Please refer to the flowsheet for vital signs taken during this treatment. After treatment:   []         Patient left in no apparent distress sitting up in chair  [x]         Patient left in no apparent distress in bed- pt declined sitting up in recliner  [x]         Call bell left within reach  [x]         Nursing notified  [x]         Caregiver present  []         Bed alarm activated  []         SCDs applied    COMMUNICATION/EDUCATION:   [x]         Role of Physical Therapy in the acute care setting. [x]         Fall prevention education was provided and the patient/caregiver indicated understanding. [x]         Patient/family have participated as able in goal setting and plan of care. [x]         Patient/family agree to work toward stated goals and plan of care. []         Patient understands intent and goals of therapy, but is neutral about his/her participation. []         Patient is unable to participate in goal setting/plan of care: ongoing with therapy staff.  []         Other:     Thank you for this referral.  Joao Kraft, PT   Time Calculation: 23 mins      Eval Complexity: History: HIGH Complexity :3+ comorbidities / personal factors will impact the outcome/ POC Exam:MEDIUM Complexity : 3 Standardized tests and measures addressing body structure, function, activity limitation and / or participation in recreation  Presentation: MEDIUM Complexity : Evolving with changing characteristics  Clinical Decision Making:Medium Complexity    Overall Complexity:MEDIUM

## 2022-03-15 ENCOUNTER — HOME HEALTH ADMISSION (OUTPATIENT)
Dept: HOME HEALTH SERVICES | Facility: HOME HEALTH | Age: 67
End: 2022-03-15
Payer: MEDICARE

## 2022-03-15 VITALS
TEMPERATURE: 97.6 F | BODY MASS INDEX: 18.83 KG/M2 | OXYGEN SATURATION: 100 % | HEART RATE: 76 BPM | SYSTOLIC BLOOD PRESSURE: 174 MMHG | RESPIRATION RATE: 16 BRPM | DIASTOLIC BLOOD PRESSURE: 77 MMHG | HEIGHT: 65 IN | WEIGHT: 113 LBS

## 2022-03-15 LAB
ANION GAP SERPL CALC-SCNC: 1 MMOL/L (ref 3–18)
BASOPHILS # BLD: 0.1 K/UL (ref 0–0.1)
BASOPHILS NFR BLD: 1 % (ref 0–2)
BUN SERPL-MCNC: 13 MG/DL (ref 7–18)
BUN/CREAT SERPL: 17 (ref 12–20)
CALCIUM SERPL-MCNC: 8.8 MG/DL (ref 8.5–10.1)
CHLORIDE SERPL-SCNC: 108 MMOL/L (ref 100–111)
CO2 SERPL-SCNC: 28 MMOL/L (ref 21–32)
CREAT SERPL-MCNC: 0.75 MG/DL (ref 0.6–1.3)
DIFFERENTIAL METHOD BLD: ABNORMAL
EOSINOPHIL # BLD: 0.4 K/UL (ref 0–0.4)
EOSINOPHIL NFR BLD: 4 % (ref 0–5)
ERYTHROCYTE [DISTWIDTH] IN BLOOD BY AUTOMATED COUNT: 15.9 % (ref 11.6–14.5)
GLUCOSE SERPL-MCNC: 85 MG/DL (ref 74–99)
HCT VFR BLD AUTO: 30 % (ref 35–45)
HGB BLD-MCNC: 9.4 G/DL (ref 12–16)
IMM GRANULOCYTES # BLD AUTO: 0.1 K/UL (ref 0–0.04)
IMM GRANULOCYTES NFR BLD AUTO: 1 % (ref 0–0.5)
LYMPHOCYTES # BLD: 1.8 K/UL (ref 0.9–3.6)
LYMPHOCYTES NFR BLD: 18 % (ref 21–52)
MCH RBC QN AUTO: 29.7 PG (ref 24–34)
MCHC RBC AUTO-ENTMCNC: 31.3 G/DL (ref 31–37)
MCV RBC AUTO: 94.6 FL (ref 78–100)
MONOCYTES # BLD: 0.6 K/UL (ref 0.05–1.2)
MONOCYTES NFR BLD: 6 % (ref 3–10)
NEUTS SEG # BLD: 7.1 K/UL (ref 1.8–8)
NEUTS SEG NFR BLD: 72 % (ref 40–73)
NRBC # BLD: 0 K/UL (ref 0–0.01)
NRBC BLD-RTO: 0 PER 100 WBC
PLATELET # BLD AUTO: 224 K/UL (ref 135–420)
PMV BLD AUTO: 11.6 FL (ref 9.2–11.8)
POTASSIUM SERPL-SCNC: 4.7 MMOL/L (ref 3.5–5.5)
RBC # BLD AUTO: 3.17 M/UL (ref 4.2–5.3)
SODIUM SERPL-SCNC: 137 MMOL/L (ref 136–145)
WBC # BLD AUTO: 9.9 K/UL (ref 4.6–13.2)

## 2022-03-15 PROCEDURE — 74011250637 HC RX REV CODE- 250/637: Performed by: INTERNAL MEDICINE

## 2022-03-15 PROCEDURE — 85025 COMPLETE CBC W/AUTO DIFF WBC: CPT

## 2022-03-15 PROCEDURE — 97535 SELF CARE MNGMENT TRAINING: CPT

## 2022-03-15 PROCEDURE — 80048 BASIC METABOLIC PNL TOTAL CA: CPT

## 2022-03-15 PROCEDURE — 36415 COLL VENOUS BLD VENIPUNCTURE: CPT

## 2022-03-15 PROCEDURE — 99239 HOSP IP/OBS DSCHRG MGMT >30: CPT | Performed by: INTERNAL MEDICINE

## 2022-03-15 PROCEDURE — 74011250636 HC RX REV CODE- 250/636: Performed by: INTERNAL MEDICINE

## 2022-03-15 RX ORDER — OXYCODONE AND ACETAMINOPHEN 5; 325 MG/1; MG/1
1 TABLET ORAL
Qty: 6 TABLET | Refills: 0 | Status: SHIPPED | OUTPATIENT
Start: 2022-03-15 | End: 2022-03-18

## 2022-03-15 RX ORDER — LEVOFLOXACIN 750 MG/1
750 TABLET ORAL DAILY
Qty: 5 TABLET | Refills: 0 | Status: SHIPPED | OUTPATIENT
Start: 2022-03-15 | End: 2022-03-20

## 2022-03-15 RX ORDER — POLYETHYLENE GLYCOL 3350 17 G/17G
17 POWDER, FOR SOLUTION ORAL DAILY
Qty: 30 PACKET | Refills: 0 | Status: SHIPPED | OUTPATIENT
Start: 2022-03-15

## 2022-03-15 RX ORDER — ATORVASTATIN CALCIUM 80 MG/1
80 TABLET, FILM COATED ORAL
Qty: 30 TABLET | Refills: 0 | Status: SHIPPED | OUTPATIENT
Start: 2022-03-15

## 2022-03-15 RX ORDER — LISINOPRIL 40 MG/1
40 TABLET ORAL DAILY
Qty: 30 TABLET | Refills: 0 | Status: SHIPPED | OUTPATIENT
Start: 2022-03-16

## 2022-03-15 RX ADMIN — LISINOPRIL 40 MG: 20 TABLET ORAL at 08:30

## 2022-03-15 RX ADMIN — LEVOFLOXACIN 750 MG: 5 INJECTION, SOLUTION INTRAVENOUS at 09:32

## 2022-03-15 RX ADMIN — METOPROLOL SUCCINATE 100 MG: 100 TABLET, EXTENDED RELEASE ORAL at 08:29

## 2022-03-15 RX ADMIN — THERA TABS 1 TABLET: TAB at 08:30

## 2022-03-15 NOTE — PROGRESS NOTES
Discharge/Transition Planning    Laura with [facility].  Home Health can accept referral. Placed in que and updated AVS

## 2022-03-15 NOTE — HOME CARE
Received  referral for SN,PT,OT, Discharge order noted for today, spoke to patient and pt's daughter Salazar Hawley) , Verified demographics,explained [facility] services and answered all questions ; [facility] referral processed to [facility] central intake. SOLO GARCIA.

## 2022-03-15 NOTE — DISCHARGE INSTRUCTIONS
Patient Education        Pneumonia: Care Instructions  Overview     Pneumonia is an infection of the lungs. Most cases are caused by infections from bacteria or viruses. Pneumonia may be mild or very severe. If it is caused by bacteria, you will be treated with antibiotics. It may take a few weeks to a few months to recover fully from pneumonia, depending on how sick you were and whether your overall health is good. Follow-up care is a key part of your treatment and safety. Be sure to make and go to all appointments, and call your doctor if you are having problems. It's also a good idea to know your test results and keep a list of the medicines you take. How can you care for yourself at home? · Take your antibiotics exactly as directed. Do not stop taking the medicine just because you are feeling better. You need to take the full course of antibiotics. · Take your medicines exactly as prescribed. Call your doctor if you think you are having a problem with your medicine. · Get plenty of rest and sleep. You may feel weak and tired for a while, but your energy level will improve with time. · To prevent dehydration, drink plenty of fluids. Choose water and other clear liquids. If you have kidney, heart, or liver disease and have to limit fluids, talk with your doctor before you increase the amount of fluids you drink. · Take care of your cough so you can rest. A cough that brings up mucus from your lungs is common with pneumonia. It is one way your body gets rid of the infection. But if coughing keeps you from resting or causes severe fatigue and chest-wall pain, talk to your doctor. Your doctor may suggest that you take a medicine to reduce the cough. · Use a vaporizer or humidifier to add moisture to your bedroom. Follow the directions for cleaning the machine. · Do not smoke or allow others to smoke around you. Smoke will make your cough last longer.  If you need help quitting, talk to your doctor about stop-smoking programs and medicines. These can increase your chances of quitting for good. · Take an over-the-counter pain medicine, such as acetaminophen (Tylenol), ibuprofen (Advil, Motrin), or naproxen (Aleve). Read and follow all instructions on the label. · Do not take two or more pain medicines at the same time unless the doctor told you to. Many pain medicines have acetaminophen, which is Tylenol. Too much acetaminophen (Tylenol) can be harmful. · If you were given a spirometer to measure how well your lungs are working, use it as instructed. This can help your doctor tell how your recovery is going. · To prevent pneumonia in the future, talk to your doctor about getting a yearly flu vaccine and a pneumococcal vaccine. When should you call for help? Call 911 anytime you think you may need emergency care. For example, call if:    · You have severe trouble breathing. Call your doctor now or seek immediate medical care if:    · You cough up dark brown or bloody mucus (sputum).     · You have new or worse trouble breathing.     · You are dizzy or lightheaded, or you feel like you may faint. Watch closely for changes in your health, and be sure to contact your doctor if:    · You have a new or higher fever.     · You are coughing more deeply or more often.     · You are not getting better after 2 days (48 hours).     · You do not get better as expected. Where can you learn more? Go to http://www.[employer].com/  Enter D336 in the search box to learn more about \"Pneumonia: Care Instructions. \"  Current as of: July 6, 2021               Content Version: 13.2  © 8413-7960 [employer]. Care instructions adapted under license by [employer] (which disclaims liability or warranty for this information).  If you have questions about a medical condition or this instruction, always ask your healthcare professional. Stan Tolliver disclaims any warranty or liability for your use of this information. Patient Education        Sepsis: Care Instructions  Overview     Sepsis is an intense reaction to an infection. It can cause damage to the body and lead to dangerously low blood pressure. You may have inflammation across large areas of your body. It can damage tissue and even go deep into your organs. Infections that can lead to sepsis include:  · A skin infection such as from a cut. · A lung infection like pneumonia. · A kidney infection. · A gut infection such as E. coli. Sepsis is treated with antibiotics. Your doctor will try to find the infection that led to sepsis. [place] also get fluids through a vein (IV). Machines will track your vital signs, including temperature, blood pressure, breathing rate, and pulse rate. The physical and mental effects of sepsis may not be seen for several weeks after treatment. And they may last long after the infection is gone. Physical problems may include:  · Feeling weak and tired. · Feeling out of breath. · Aches and pains. · Problems with getting around. · Trouble falling asleep or staying asleep. · Dry and itchy skin, brittle nails, and hair loss. Some of these effects can lead to problems with your organs or your feet, legs, hands, or arms. Sepsis can also affect your mind and emotions. Problems may include:  · Self-doubt. · Anxiety. · Nightmares. · Depression and mood problems. · Wanting to avoid other people. · Confusion. · Flashbacks and bad memories of your illness. It's important to care for yourself and try to avoid infections. This may lower your risk of getting sepsis again. Follow-up care is a key part of your treatment and safety. Be sure to make and go to all appointments, and call your doctor if you are having problems. It's also a good idea to know your test results and keep a list of the medicines you take. How can you care for yourself at home? · Be safe with medicines.  Take your medicines exactly as prescribed. Call your doctor if you think you are having a problem with your medicine. · If your doctor prescribed antibiotics, take them as directed. Do not stop taking them just because you feel better. You need to take the full course of antibiotics. · Help prevent infections that could again lead to sepsis. ? Try to avoid colds and flu. If you must be around people who have a cold or the flu, wash your hands often. And get a flu vaccine every year. ? Ask your doctor if you need a pneumococcal vaccine (to prevent pneumonia, meningitis, and other infections). If you have had one before, ask your doctor if you need another dose. ? Clean any wounds or scrapes. · Do not smoke or use other tobacco products. When you quit smoking, you are less likely to get a cold, the flu, bronchitis, and pneumonia. If you need help quitting, talk to your doctor about stop-smoking programs and medicines. These can increase your chances of quitting for good. · Drink plenty of fluids to prevent dehydration. Choose water and other clear liquids until you feel better. If you have kidney, heart, or liver disease and have to limit fluids, talk with your doctor before you increase the amount of fluids you drink. · Eat a healthy diet. Include fruits, vegetables, and whole grains in your diet every day. · If your doctor recommends it, try doing some physical activity. Walking is a good choice. Bit by bit, increase the amount you walk every day. · Talk with your family and friends about your challenges. Ask for help if you need it. · Keep a journal. Writing down your thoughts and feelings can help reduce your stress. · Ask family members to fill in gaps in your memory. · Set small goals for yourself that you can reach. Reward yourself for success. When should you call for help? Call 911  anytime you think you may need emergency care. For example, call if:    · You passed out (lost consciousness).    Call your doctor now or seek immediate medical care if:    · You have symptoms such as:  ? Shortness of breath. ? Feeling very sick. ? Severe pain. ? A fast heart rate. ? Cool, pale, or clammy skin. ? Feeling confused. ? Feeling very sleepy, or you are hard to wake up.     · You are dizzy or lightheaded, or you feel like you may faint.     · You have a fever or chills. Watch closely for changes in your health, and be sure to contact your doctor if:    · You do not get better as expected. Where can you learn more? Go to http://www.gray.com/  Enter T383 in the search box to learn more about \"Sepsis: Care Instructions. \"  Current as of: July 1, 2021               Content Version: 13.2  © 8067-4625 [employer]. Care instructions adapted under license by [employer] (which disclaims liability or warranty for this information). If you have questions about a medical condition or this instruction, always ask your healthcare professional. [address] any warranty or liability for your use of this information.

## 2022-03-15 NOTE — PROGRESS NOTES
Bedside and Verbal shift change report given to YAZMIN Morel (oncoming nurse) by Cassia Beckford RN (offgoing nurse). Report included the following information SBAR, Kardex, Intake/Output, MAR and Recent Results     .

## 2022-03-15 NOTE — DISCHARGE SUMMARY
Discharge Summary     Patient ID:  Esha Chaney  [id number]  62 y.o.  1955  Body mass index is 18.8 kg/m². PCP on record: Telma Aguirre MD    Admit date: 3/11/2022  Discharge date and time: 3/15/2022      Reason for admission: AMS     Discharge Diagnoses:                                           1.  Acute metabolic encephalopathy  2 dehydration  3 gram-negative sepsis- Source not clear   4 hypertension  5 old / chronic small multiple CVAs  6 chronic tobacco abuse   7 history of falls history of possible syncope this admission-   8 Severe constipation   9 Abnormality in Uterus - on CT abd .   10 Hypokalemia   11 leukocytosis-improving      Consults: ID          Hospital Course by problems:          Patient seen and examined by me on discharge day. Pertinent Findings:  Stable on discharge   Visit Vitals  BP (!) 174/77 (BP 1 Location: Right upper arm)   Pulse 76   Temp 97.6 °F (36.4 °C)   Resp 16   Ht 5' 5\" (1.651 m)   Wt 51.3 kg (113 lb)   SpO2 100%   BMI 18.80 kg/m²            Significant Diagnostic Studies:  Results  CT HEAD WO CONT (Accession [id number]) (Order [id number])    Allergies       High: Codeine   Not Specified: Interferon Beta-1a; Penicillins     Exam Information    Status Exam Begun  Exam Ended    Final [99] 3/11/2022 08:11 3/11/2022  8:19 AM [id number]  8:19 AM     Result Information    Status: Final result (Exam End: 3/11/2022 08:19) Provider Status: Open       CT HEAD WO CONT: Patient Communication     Released  Not seen     Study Result    Narrative & Impression   EXAM:  CT Head without Contrast              CLINICAL INDICATION:       - Trauma. Fall. - 75 y.o. female female with past medical history of MS, hematuria hypertension  kidney stones and stroke. Presents with a fall. ? Apparently the patient has had  increased confusion and decreased appetite over the past few days so family  became concerned that she was having an infection. ? This morning the patient had  a fall so her daughter called 911. COMPARISON:  None. TECHNIQUE:       - Helical volumetric CT imaging of the head is performed from the base of the  skull to the vertex without IV contrast administration. Axial, coronal and  sagittal images are generated from the volumetric data set. - Dose optimization techniques are utilized as appropriate to the performed exam  with combination of automated exposure control, adjustment of mA and/or kV  according to patient size, and use of iterative reconstructive technique.      FINDINGS:      Brain:     - Hemorrhage/ hematoma:  No acute intracranial hemorrhage/ hematoma. - Mass, mass effect:  None. - Gray-white matter differentiation:  Moderate right matter hypodensities,  suggestive of chronic microvascular ischemic changes. Multiple small chronic  lacunar infarcts at the basal ganglia. Small focus of another probable chronic  lacunar infarct in the left cerebellar hemisphere.     CSF Spaces:  No acute abnormalities.     Calvarium:  Intact.     Sinuses, Mastoids: The right sphenoid sinus is completely filled with retained  internal densities, with circumferential osseous sphenoid sinus wall thickening  and sclerotic changes, suggestive of underlying chronic obstruction and  underlying chronic inflammatory changes.     IMPRESSION                1.  No acute intracranial abnormalities. 2.  Moderate chronic appearing microvascular ischemic changes. 3.  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 Imaging    CT HEAD WO CONT (Order: [id number]) - 3/11/2022      +++++++++++++++++++++++++++++++++++++++++++++++++++++  Results  CT ABD PELV W CONT (Accession [id number]) (Order [id number])    Allergies       High: Codeine   Not Specified: Interferon Beta-1a;   Penicillins     Exam Information    Status Exam Begun  Exam Ended    Final [99] 3/12/2022 14:16 3/12/2022  2:36 PM [id number]  2:36 PM     Result Information    Status: Final result (Exam End: 3/12/2022 14:36) Provider Status: Open       CT ABD PELV W CONT: Patient Communication     Released  Not seen     Study Result    Narrative & Impression   EXAM: CT ABDOMEN AND PELVIS WITH CONTRAST     CLINICAL HISTORY/INDICATION:  evaluate for undiagnosed GI/Gu pathology. sepsis,  gram negative bacteremia     COMPARISON: [facility] Ultrasound abdomen 3/11/2022. CT chest 3/11/2022     TECHNIQUE: Following the uneventful intravenous administration of 100 cc of  nonionic contrast, dynamic enhanced scanning of the abdomen and pelvis was  performed using standard 5 mm axial images. Coronal and sagittal reformations  obtained.     All CT scans at this facility are performed using dose optimization technique as  appropriate to a performed exam, to include automated exposure control,  adjustment of the mA and/or kV according to patient's size (including  appropriate matching for site-specific examinations), or use of iterative  reconstruction technique.     FINDINGS:      Abdomen -     Redemonstrated flat 6 x 4 mm nodule along the major fissure as well as 5 mm flat  oval density along the major fissure both at the right lung base without change  from the exam of the day prior. Minimal bilateral dependent atelectasis.     Mild anasarca.     Solitary 4 mm hypodensity high in the right dome of the liver is too small to  characterize. Axial image 12 series 2. Small focal region of hypodensity along  falciform ligament of fatty infiltration. No biliary dilatation. Gallbladder normal size. No gallbladder wall thickening.     Right kidney is slightly smaller than the left. Right renal upper pole cortical  scarring. Normal enhancement of both kidneys. No renal calculi. No  hydronephrosis nor hydroureter.     Adrenals and Pancreas  are of normal CT appearance.     There is no free fluid or free air.     Increased stool throughout the colon.  No bowel wall thickening.     There is no significant adenopathy.     Pelvis -     Posterior perirectal fluid in the presacral space.     Uterus is mildly retroflexed. There is a large central homogeneous collection of  fluid within the central uterus with enhancement of the adjacent endometrium. There is marked thinning of the myometrium. Additional small focal region of  calcification at the left fundus. Eccentric left sided soft tissue density with  central hypodensity. Measurement 2.9 x 2.7 cm. Axial image 60 series 2. Separate  ovaries are not identified.     The bladder is incompletely distended but unremarkable.     There is no significant adenopathy.     Review of osseous structures throughout on bone window settings shows no  significant osseous pathology.     IMPRESSION     Abnormal uterus. Either large fluid collection within the endometrial cavity  with thinning of the myometrium and abnormal endometrial enhancement or large  central mass. Favor fluid within the endometrial canal.  Left adnexal ill-defined 3 cm soft tissue density mass with differential  diagnosis of subserosal fibroid vs. left ovary. Recommend pelvic ultrasound with both transabdominal and transvaginal  components. Mild anasarca. Mild posterior perirectal edema. Increased stool throughout the colon. Nonvisualization of the appendix. There is however no right lower quadrant  inflammation. Cortical thinning of the upper pole of the right kidney. Subcentimeter right intrahepatic low density masses too small to characterize. No imaging follow-up needed. Minimal bilateral lower lobe dependent atelectasis. Stable 2 small flat nodules along the right major fissure recommend CT follow-up  at 12 months if the patient is at high risk for lung cancer.            Pertinent Lab Data:  Recent Labs     03/15/22  0243 03/14/22  0432 03/13/22  0128   WBC 9.9 12.6 18.5*   HGB 9.4* 9.1* 8.2*   HCT 30.0* 29.3* 26.7*    201 203     Recent Labs     03/15/22  0243 03/14/22  0432 03/13/22  0128    141 144   K 4.7 5.4 3.3*    113* 114*   CO2 28 27 24   GLU 85 84 55*   BUN 13 19* 21*   CREA 0.75 0.87 0.77   CA 8.8 8.9 8.7       DISCHARGE MEDICATIONS:   @  Current Discharge Medication List      START taking these medications    Details   !! atorvastatin (LIPITOR) 80 mg tablet Take 1 Tablet by mouth nightly. Qty: 30 Tablet, Refills: 0  Start date: 3/15/2022      levoFLOXacin (Levaquin) 750 mg tablet Take 1 Tablet by mouth daily for 5 days. Qty: 5 Tablet, Refills: 0  Start date: 3/15/2022, End date: 3/20/2022      oxyCODONE-acetaminophen (PERCOCET) 5-325 mg per tablet Take 1 Tablet by mouth every eight (8) hours as needed for Pain for up to 3 days. Max Daily Amount: 3 Tablets. Qty: 6 Tablet, Refills: 0  Start date: 3/15/2022, End date: 3/18/2022    Associated Diagnoses: Low back pain, unspecified back pain laterality, unspecified chronicity, unspecified whether sciatica present      polyethylene glycol (MIRALAX) 17 gram packet Take 1 Packet by mouth daily. Qty: 30 Packet, Refills: 0  Start date: 3/15/2022       !! - Potential duplicate medications found. Please discuss with provider. CONTINUE these medications which have CHANGED    Details   !! lisinopriL (PRINIVIL, ZESTRIL) 40 mg tablet Take 1 Tablet by mouth daily. Qty: 30 Tablet, Refills: 0  Start date: 3/16/2022       !! - Potential duplicate medications found. Please discuss with provider. CONTINUE these medications which have NOT CHANGED    Details   metoprolol succinate (TOPROL-XL) 100 mg tablet Take 100 mg by mouth daily. !! atorvastatin (LIPITOR) 80 mg tablet Take 80 mg by mouth nightly. !! lisinopriL (PRINIVIL, ZESTRIL) 40 mg tablet Take 40 mg by mouth daily. cyclobenzaprine (FLEXERIL) 10 mg tablet Take 10 mg by mouth every eight (8) hours as needed for Muscle Spasm(s). clonazePAM (KLONOPIN) 0.5 mg tablet Take 0.5 mg by mouth three (3) times daily as needed (tremor).       multivitamin (ONE A DAY) tablet Take 1 Tab by mouth daily. aspirin (ASPIRIN) 325 mg tablet Take 325 mg by mouth nightly. !! - Potential duplicate medications found. Please discuss with provider. STOP taking these medications       simvastatin (ZOCOR) 20 mg tablet Comments:   Reason for Stopping:                 My Recommended Diet, Activity, Wound Care, and follow-up labs are listed in the patient's Discharge Insturctions which I have personally completed and reviewed. - Regula diet - no salt added   - activity as tolerated       Disposition:     [] Home with family     [x] HH PT/RN   [] SNF/NH   [] Inpatient Rehab/[facility]  Condition at Discharge:  Stable    Follow up with:   PCP : Bernard Cheney MD      Please follow-up tests/labs that are still pendin. Need Gyn out patient consultation for abnormal mass on CT abd Pelvis ( intra uterine ) , with vag. Discharge . 2.    >30 minutes spent coordinating this discharge (review instructions/follow-up, prescriptions, preparing report for sign off)    Disclaimer: Sections of this note are dictated utilizing voice recognition software, which may have resulted in some phonetic based errors in grammar and contents. Even though attempts were made to correct all the mistakes, some may have been missed, and remained in the body of the document. If questions arise, please contact our department.     Signed:  Yasmani Villa MD

## 2022-03-15 NOTE — PROGRESS NOTES
Infectious Disease progress Note        Reason: Evaluate for sepsis    Current abx Prior abx   Levofloxacin since 3/11  Cefepime since 3/11 Cefepime on 3/11  vancomycin 3/11-3/12     Lines:       Assessment :    74 y.o. female female with past medical history of multiple sclerosis, hypertension, renal stones, stroke presented to [facility] ED  on 3/11 with altered mental status, status post fall. Fully vaccinated for COVID-19. Booster vaccine in November 2021    Now with low-grade fever, poor appetite for about a week, altered mentation    clinical presentation c/w  Sepsis (POA) due to proteus, e.coli bloodstream infection-positive blood culture 3/11, negative blood culture 3/13    Proteus/E. coli bloodstream infection likely secondary to recent constipation    Lack of significant pyuria argues against cystitis. Urine culture 3/11-15,000 colonies of organisms likely colonized  Significant stool noted throughout the colon on CT scan 3/12/2022 confirms clinical suspicion of constipation related bloodstream infection. No evidence of cholecystitis noted on CT scan or ultrasound    ? Left adnexal mass : CT findings 3/12/2022 concerning for left adnexal mass. Patient complains of brownish drainage per vagina since the past couple days. Negative covid-19 pcr 3/11/22    Altered mental status-likely metabolic encephalopathy. Improving. Still not at baseline per family at bedside    Antibiotic management complicated due to documented history of penicillin allergy-hives per report. Unable to confirm nature of allergic reaction due to patient's altered mentation. Tolerating cefepime without difficulties    Cynical better. Improved mentation    Recommendations:    1. Continue levofloxacin till 3/20/2022  2. Recommend outpatient GYN follow-up to evaluate for possible adnexal mass, vaginal discharge  3.   Okay to discharge patient from infectious disease standpoint     Above plan was discussed in details with dr Terry Suero, daughter at bedside. please call me if any further questions or concerns. Will continue to participate in the care of this patient. HPI:    Feels better. Denies increasing abdominal pain. Improved diarrhea. Complains of brownish liquid coming from vagina. Does not think it stool. States that it is coming from the vagina. Feels ready to go home    Past Medical History:   Diagnosis Date    Autoimmune disease ([address]     MS    Gross hematuria     Hypertension     Kidney stones     MS (congenital mitral stenosis)     Stroke (cerebrum) [facility])        Past Surgical History:   Procedure Laterality Date    HX UROLOGICAL  12/2016    stent placement       Current Discharge Medication List        CONTINUE these medications which have NOT CHANGED    Details   metoprolol succinate (TOPROL-XL) 100 mg tablet Take 100 mg by mouth daily. atorvastatin (LIPITOR) 80 mg tablet Take 80 mg by mouth nightly. lisinopriL (PRINIVIL, ZESTRIL) 40 mg tablet Take 40 mg by mouth daily. cyclobenzaprine (FLEXERIL) 10 mg tablet Take 10 mg by mouth every eight (8) hours as needed for Muscle Spasm(s). clonazePAM (KLONOPIN) 0.5 mg tablet Take 0.5 mg by mouth three (3) times daily as needed (tremor). multivitamin (ONE A DAY) tablet Take 1 Tab by mouth daily. aspirin (ASPIRIN) 325 mg tablet Take 325 mg by mouth nightly.            STOP taking these medications       simvastatin (ZOCOR) 20 mg tablet Comments:   Reason for Stopping:               Current Facility-Administered Medications   Medication Dose Route Frequency    glucose chewable tablet 16 g  4 Tablet Oral PRN    glucagon (GLUCAGEN) injection 1 mg  1 mg IntraMUSCular PRN    dextrose 10% infusion 0-250 mL  0-250 mL IntraVENous PRN    oxyCODONE-acetaminophen (PERCOCET) 5-325 mg per tablet 1 Tablet  1 Tablet Oral Q6H PRN    levoFLOXacin (LEVAQUIN) 750 mg in D5W IVPB  750 mg IntraVENous Q24H    sodium chloride (NS) flush 5-10 mL  5-10 mL IntraVENous PRN    aspirin tablet 325 mg  325 mg Oral QHS    clonazePAM (KlonoPIN) tablet 0.5 mg  0.5 mg Oral TID PRN    cyclobenzaprine (FLEXERIL) tablet 10 mg  10 mg Oral Q8H PRN    lisinopriL (PRINIVIL, ZESTRIL) tablet 40 mg  40 mg Oral DAILY    therapeutic multivitamin (THERAGRAN) tablet 1 Tablet  1 Tablet Oral DAILY    atorvastatin (LIPITOR) tablet 80 mg  80 mg Oral QHS    sodium chloride (NS) flush 5-40 mL  5-40 mL IntraVENous Q8H    sodium chloride (NS) flush 5-40 mL  5-40 mL IntraVENous PRN    acetaminophen (TYLENOL) tablet 650 mg  650 mg Oral Q6H PRN    Or    acetaminophen (TYLENOL) suppository 650 mg  650 mg Rectal Q6H PRN    polyethylene glycol (MIRALAX) packet 17 g  17 g Oral DAILY PRN    promethazine (PHENERGAN) tablet 12.5 mg  12.5 mg Oral Q6H PRN    Or    ondansetron (ZOFRAN) injection 4 mg  4 mg IntraVENous Q6H PRN    enoxaparin (LOVENOX) injection 40 mg  40 mg SubCUTAneous DAILY    metoprolol succinate (TOPROL-XL) tablet 100 mg  100 mg Oral DAILY       Allergies: Codeine, Interferon beta-1a, and Penicillins    History reviewed. No pertinent family history. Social History     Socioeconomic History    Marital status:      Spouse name: Not on file    Number of children: Not on file    Years of education: Not on file    Highest education level: Not on file   Occupational History    Not on file   Tobacco Use    Smoking status: Former Smoker    Smokeless tobacco: Never Used   Substance and Sexual Activity    Alcohol use: Yes     Comment: socially    Drug use: No    Sexual activity: Not on file   Other Topics Concern    Not on file   Social History Narrative    Not on file     Social Determinants of Health     Financial Resource Strain:     Difficulty of Paying Living Expenses: Not on file   Food Insecurity:     Worried About [employer] in the Last Year: Not on file    Ran Out of Food in the Last Year: Not on file   Transportation Needs:     Lack of Transportation (Medical):  Not on file    Lack of Transportation (Non-Medical): Not on file   Physical Activity:     Days of Exercise per Week: Not on file    Minutes of Exercise per Session: Not on file   Stress:     Feeling of Stress : Not on file   Social Connections:     Frequency of Communication with Friends and Family: Not on file    Frequency of Social Gatherings with Friends and Family: Not on file    Attends Hoahaoism Services: Not on file    Active Member of [address] [place] [address] or Organizations: Not on file    Attends Club or Organization Meetings: Not on file    Marital Status: Not on file   Intimate Partner Violence:     Fear of Current or Ex-Partner: Not on file    Emotionally Abused: Not on file    Physically Abused: Not on file    Sexually Abused: Not on file   Housing Stability:     Unable to Pay for Housing in the Last Year: Not on file    Number of [place] in the Last Year: Not on file    Unstable Housing in the Last Year: Not on file     Social History     Tobacco Use   Smoking Status Former Smoker   Smokeless Tobacco Never Used        Temp (24hrs), Av.6 °F (36.4 °C), Min:97 °F (36.1 °C), Max:98.2 °F (36.8 °C)    Visit Vitals  BP (!) 174/77 (BP 1 Location: Right upper arm)   Pulse 76   Temp 97.6 °F (36.4 °C)   Resp 16   Ht 5' 5\" (1.651 m)   Wt 51.3 kg (113 lb)   SpO2 100%   BMI 18.80 kg/m²       ROS: 12 point review systems obtained details, pertinent positives mentioned in HPI, otherwise negative    Physical Exam:    General: Well developed, well nourished female laying on the bed AAOx3 in no acute distress. General:   awake alert and oriented   HEENT:  Normocephalic, atraumatic,EOMI, no scleral icterus or pallor; no conjunctival hemmohage;  nasal and oral mucous are moist and without evidence of lesions. Filled dental cavities, missing maxillary teeth neck supple, no bruits.    Lymph Nodes:   no cervical, axillary or inguinal adenopathy   Lungs:   non-labored, bilaterally clear to auscultation- no crackles wheezes rales or rhonchi   Heart:  RRR, s1 and s2; no rubs or gallops, no edema, + pedal pulses   Abdomen:  soft, non-distended, active bowel sounds, no hepatomegaly, no splenomegaly. Non-tender   Genitourinary:  deferred   Extremities:   no clubbing, cyanosis; no joint effusions or swelling; Full ROM of all large joints to the upper and lower extremities; muscle mass appropriate for age   Neurologic:  No gross focal sensory abnormalities; 5/5 muscle strength to upper and lower extremities. Speech appropriate. Cranial nerves intact                        Skin:  No rash or ulcers noted   Back:  no spinal or paraspinal muscle tenderness or rigidity, no CVA tenderness      Psychiatric:  No suicidal or homicidal ideations, appropriate mood and affect          Labs: Results:   Chemistry Recent Labs     03/15/22  0243 03/14/22  0432 03/13/22  0128   GLU 85 84 55*    141 144   K 4.7 5.4 3.3*    113* 114*   CO2 28 27 24   BUN 13 19* 21*   CREA 0.75 0.87 0.77   CA 8.8 8.9 8.7   AGAP 1* 1* 6   BUCR 17 22* 27*      CBC w/Diff Recent Labs     03/15/22  0243 03/14/22  0432 03/13/22  0128   WBC 9.9 12.6 18.5*   RBC 3.17* 3.07* 2.78*   HGB 9.4* 9.1* 8.2*   HCT 30.0* 29.3* 26.7*    201 203   GRANS 72 82* 78*   LYMPH 18* 11* 4*   EOS 4 3 0      Microbiology Recent Labs     03/13/22  0740   CULT NO GROWTH 2 DAYS          RADIOLOGY:    All available imaging studies/reports in [facility] for this admission were reviewed    High complexity decision making was performed during the evaluation of this patient at high risk for decompensation with multiple organ involvement       Disclaimer: Sections of this note are dictated utilizing voice recognition software, which may have resulted in some phonetic based errors in grammar and contents. Even though attempts were made to correct all the mistakes, some may have been missed, and remained in the body of the document. If questions arise, please contact our department.     Dr. Corita Gitelman, Infectious Disease Specialist  [phone]  March 15, 2022  11:18 AM

## 2022-03-15 NOTE — ROUTINE PROCESS
Bedside and Verbal shift change report given to [address] [place] [address] (oncoming nurse) by Brown Mccrary RN (offgoing nurse). Report included the following information SBAR, Kardex, Intake/Output, MAR and Recent Results. Patient alert and resting in bed. Call bell and bedside commode in reach.

## 2022-03-15 NOTE — PROGRESS NOTES
Problem: Self Care Deficits Care Plan (Adult)  Goal: *Acute Goals and Plan of Care (Insert Text)  Description: Occupational Therapy Goals  Initiated 3/14/2022 within 7 day(s). 1.  Patient will perform grooming with modified independence in stance at sink using RW with < 2 seated rest breaks. 2.  Patient will perform bathing with modified independence using adaptive equipment as needed. 3.  Patient will perform upper body dressing and lower body dressing with modified independence using adaptive equipment as needed. 4.  Patient will perform toilet transfers with modified independence using RW with good balance. 5.  Patient will perform all aspects of toileting with modified independence. 6.  Patient will participate in upper extremity therapeutic exercise/activities with modified independence for 8 minutes. 7.  Patient will utilize energy conservation techniques during functional activities with min verbal cues. Prior Level of Function: mod I with ADLs and functional mobility w/o AD until ~ 1 month ago, became weaker     Outcome: Progressing Towards Goal   OCCUPATIONAL THERAPY TREATMENT    Patient: Miki Mo (65 y.o. female)  Date: 3/15/2022  Diagnosis: Acute sepsis ([facility] [A41.9]  Fall [W19. XXXA]  Community acquired bacterial pneumonia [J15.9]  Sepsis ([facility] [A41.9] <principal problem not specified>       Precautions: Fall  PLOF: mod I with ADLs and functional mobility w/o AD until ~ 1 month ago, became weaker    Chart, occupational therapy assessment, plan of care, and goals were reviewed. ASSESSMENT:  Per chart review pt with d/c for today. Pt presented supine in bed upon therapist arrival with family member present. Pt educated on mult energy conservation techniques to utilize in home environment, including pacing and deep breathing to prevent SOB and fatigue, and increase activity tolerance and safety w/ADLs and functional mobility, pt verbalized understanding.  Pt declining functional activity at this time 2/2 being discharged today. She was left with HOB elevated, family member present, and all needs left within reach. Progression toward goals:  []          Improving appropriately and progressing toward goals  [x]          Improving slowly and progressing toward goals  []          Not making progress toward goals and plan of care will be adjusted     PLAN:  Patient continues to benefit from skilled intervention to address the above impairments. Continue treatment per established plan of care. Discharge Recommendations:   with 24/7 Supervision vs Rehab   Further Equipment Recommendations for Discharge:  bedside commode, shower chair, and rolling walker     SUBJECTIVE:   Patient stated  I am ready to go home. \"     OBJECTIVE DATA SUMMARY:   Cognitive/Behavioral Status:  Neurologic State: Alert  Orientation Level: Oriented X4  Cognition: Follows commands  Safety/Judgement: Fall prevention    Pain:  Pain level pre-treatment: 0/10   Pain level post-treatment: 0/10    Activity Tolerance:    Fair   Please refer to the flowsheet for vital signs taken during this treatment. After treatment:   []  Patient left in no apparent distress sitting up in chair  [x]  Patient left in no apparent distress in bed  [x]  Call bell left within reach  [x]  Nursing notified  [x]  Family present  [x]  Bed alarm activated    COMMUNICATION/EDUCATION:   [x] Role of Occupational Therapy in the acute care setting  [] Home safety education was provided and the patient/caregiver indicated understanding. [x] Patient/family have participated as able in working towards goals and plan of care. [x] Patient/family agree to work toward stated goals and plan of care. [] Patient understands intent and goals of therapy, but is neutral about his/her participation. [] Patient is unable to participate in goal setting and plan of care.       Thank you for this referral.  MGADIEL Dale  Time Calculation: 10 mins

## 2022-03-15 NOTE — PROGRESS NOTES
PT treatment attempted. Pt reporting ready to go home and declining to get up. Pt reporting she does not want to trial stairs before discharge. Pt left in supine.      Thank you for this referral   Selena Ireland PT DPT

## 2022-03-17 ENCOUNTER — HOME CARE VISIT (OUTPATIENT)
Dept: SCHEDULING | Facility: HOME HEALTH | Age: 67
End: 2022-03-17
Payer: MEDICARE

## 2022-03-17 ENCOUNTER — HOME CARE VISIT (OUTPATIENT)
Dept: HOME HEALTH SERVICES | Facility: HOME HEALTH | Age: 67
End: 2022-03-17

## 2022-03-17 VITALS
DIASTOLIC BLOOD PRESSURE: 68 MMHG | SYSTOLIC BLOOD PRESSURE: 154 MMHG | RESPIRATION RATE: 16 BRPM | HEART RATE: 68 BPM | TEMPERATURE: 97.8 F | OXYGEN SATURATION: 96 %

## 2022-03-17 PROCEDURE — 400013 HH SOC

## 2022-03-17 PROCEDURE — G0299 HHS/HOSPICE OF RN EA 15 MIN: HCPCS

## 2022-03-17 NOTE — Clinical Note
Patient admitted to [facility] today for disease management/education, med management/education with the following SN visit set 1w4, 2 PRN. Thank you.

## 2022-03-17 NOTE — HOME HEALTH
Skilled Reason for admission/summary of clinical condition:  disease management/education, med management/education. This patient is homebound for the following reasons Requires considerable and taxing effort to leave the home , Requires the assistance of 1 or more persons to leave the home  and Only leaves the home for medical reasons or Buddhism services and are infrequent and of short duration for other reasons . Caregiver: relative. Caregiver assists with available for ADLs, meal prep, med management and transportation. Medications reconciled and all medications are available in the home this visit. The following education was provided regarding medications: Educated patient on narcotic side effects which include, drowsiness, dizziness, constipation and nausea. Educated patient on use of anxiolytics, proper storage, watch for and drowsiness or dizziness that may occur. sn instructed patient how BP meds keep veins open to allow for blood flow at lower pressure and how this can lead to dizziness and falls with sudden, quick movements. Educated patient on use of antibiotics and ensuring that the entire course is completed. Medications  are too early to assess at this time. High risk medication teaching regarding anticoagulants, hyperglycemic agents or opiod narcotics performed (specify) percocet klonipin. No discrepancies/look a like medications/medication interactions found. Home health supplies by type and quantity ordered/delivered this visit include: N/A    Patient education provided this visit to include: perform skin inspections looking for any skin breakdown or redness. monitor for edema. frequent position changes. Watch for signs and symptoms of infection which include; fever, drainage, foul smell, lethargy. perform skin inspections looking for any skin breakdown or redness. monitor for edema. frequent position changes.       Patient level of understanding of education provided: Patient verbalized understanding of all education provided. [place] Education Provided: N/A  Patient response to procedure performed:  N/A    Home exercise program/Homework provided: sn instructed patient to perform deep breathing exercises, 5-6 breaths 5 x daily to promote air exchange and prevent pneumonia. PT/OT to eval    Pt/Caregiver instructed on plan of care and are agreeable to plan of care at this time. Physician Bailee Landrum NP notified of patient admission to home health and plan of care including anticipated frequency of 1w4, 2prn and treatments/interventions/modalities of disease management/education, med management/education. Discharge planning discussed with patient and caregiver. Discharge planning as follows: When goals are met. Pt is able to manage disease and medication with no difficulty and pt reaches maximum level of function and health condition stable. Pt/Caregiver did verbalize understanding of discharge planning. Next MD appointment TBD (date) with Bailee FINN. Daughter is attempting to schedule follow up visit. Patient/caregiver encouraged/instructed to keep appointment as lack of follow through with physician appointment could result in discontinuation of home care services for non-compliance.

## 2022-03-18 ENCOUNTER — HOME CARE VISIT (OUTPATIENT)
Dept: SCHEDULING | Facility: HOME HEALTH | Age: 67
End: 2022-03-18
Payer: MEDICARE

## 2022-03-18 VITALS
HEART RATE: 72 BPM | DIASTOLIC BLOOD PRESSURE: 70 MMHG | RESPIRATION RATE: 16 BRPM | TEMPERATURE: 98 F | SYSTOLIC BLOOD PRESSURE: 122 MMHG | OXYGEN SATURATION: 98 %

## 2022-03-18 PROBLEM — A41.9 SEPSIS (HCC): Status: ACTIVE | Noted: 2022-03-11

## 2022-03-18 PROBLEM — N20.0 KIDNEY STONES: Status: ACTIVE | Noted: 2017-01-23

## 2022-03-18 PROCEDURE — G0151 HHCP-SERV OF PT,EA 15 MIN: HCPCS

## 2022-03-18 NOTE — HOME HEALTH
PT INITIAL EVALUATION    Selam Albarran is a 74 y.o. female who has past medical history of hypertension history of multiple small strokes MS diagnosed long time back now admitted for history of falls, multiple falls in the past.  Patient was actively seeing neurologist in the area however he retired they do not remember the name. Patient has some lapse of memory regarding her history most history obtained from family member in room with patient     Patient was in her usual state of mind she is functional she walks around in the house without any help, eats well, has a history of smoking and occasional alcohol drinking. Developed anorexia and not eating well family members also found that she was confused and then she started having falls and she was brought to the emergency room. In the emergency room her lactic acid level was high some  Bands, suspected sepsis patie nt was started on broad-spectrum antibiotics pancultured ID consultation done. Past Medical Hx:   Autoimmune disease ([address]        MS    Gross hematuria      Hypertension      Kidney stones      MS (congenital mitral stenosis)      [facility]brum[facility]     Recent H/o current illness:  74 year old female presents with MD referral for HHPT s/p hospitalization due to sepsis after a fall  Medication Management: daughter manages medications  Social hx and home eval:  Pt lives in 2 story home with daughter.    Caregiver Involvement: assists with medical appointments, medication management and some ADL support  PLOF:  Pt PLOF is ambulation w SPC, pts base level of function independent with all ADL's  BALANCE:     Seated unsupported balance is good   Standing static balance is fair  Standing dynamic balance is fair-  Tinetti 17 /28    Patient is at risk for falls due to recent hospitalization and weakness  BLE Strength:  Left Hip flexion 3-/5 , hip abduction 3-/5, hip adduction 3-/5, Knee flexion 3/5  knee extension 3/5, ankle dorsiflexion 3+/5  Right Hip flexion 3-/5 , hip abduction 3-/5, hip adduction 3-/5, Knee flexion 3/5  knee extension 3/5, ankle dorsiflexion 3+/5  BLE ROM:  Right hip/knee/ankle: WFL  Left hip/knee/ankle: WFL  Bed mobility:  min A   Transfers:  min A with sit<->stand for bed to chair, with no AD. min cues and instruction needed for safety and sequencing  GAIT:  Patient ambulated  50 ft  with no AD  on level  surfaces CGA/min A. Pt demonstrates with decreased hip and knee flexion on BLE in pre and mid swing phase of gait as well as decreased stride-length and susi. Patient is unable to safely ambulate without assistance at this time. Pt required min cues for  safety and sequencing  Stairs: NT  Patient education provided this visit: Safety with functional mobility and instruction with HEP. Patient level of understanding of education provided: fair with some short term memory recall issues of the HEP provided. Patient was able to return demonstrate the exercises with minimal assistance. Daughter present intermittently during therapy session. Will need continued education for safety due to short term memory impairments. Patient response to treatment:  tolerated treatment well with no complaint of increased pain post treatment. Assessment: Referral for HHPT following recent hospitalization due to sepsis and a fall. HHPT is medically necessary to address the following clinical findings: decreased BLE strength and ROM, impaired gait, decreased I and safety with transfers and gait, decreased endurance, decreased balance and decreased safety in order to improve functional mobility and decrease fall risk. Pt is a fall risk as indicated by Tinetti score of 17/28.   Patient will be seen for HHPT 1w1, 2w3, 1w1 for therapeutic exercises to increase BLE strength and ROM,  training for gait, stairs and transfers to increase I and safety with daily functional mobility and balance and endurance activities to decrease fall risk, decrease impairment and increase functional mobility and independence in the home. Pt. requires skilled PT intervention to instruct Pt. with gait training with LRAD, ROM and strengthening, stair training, transfer training, balance training, manual therapy, neuromuscular re-education, patient care-giver education, HEP, endurance training, body mechanics. Instructed patient with HEP for BLE/core strengthening and left HEP handout for the patient. Patient was able to return demonstrate the exercises given. HEP includes:   Pt. received therapeutic exercises which included:  Squats, marching in place, Hip abd/add, toe raises and hip ext.  x 10, 3 times/day

## 2022-03-18 NOTE — Clinical Note
Therapy Functional Score Assessment  Question   Score   Grooming  2       Upper Dressing 2      Lower Dressing 2      Bathing  3      Toilet Transfer  2    Transfer  2            Ambulation  3   Dyspnea                     1       Pain Interfering with activity 2  Est number therapy visits      8

## 2022-03-19 PROBLEM — N23 RENAL COLIC ON LEFT SIDE: Status: ACTIVE | Noted: 2017-01-23

## 2022-03-19 PROBLEM — N13.2 URETERAL STONE WITH HYDRONEPHROSIS: Status: ACTIVE | Noted: 2017-01-23
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
## 2022-03-19 PROBLEM — N17.9 AKI (ACUTE KIDNEY INJURY) (HCC): Status: ACTIVE | Noted: 2017-01-23

## 2022-03-19 PROBLEM — Z96.0 RETAINED URETHRAL STENT: Status: ACTIVE | Noted: 2017-01-23

## 2022-03-19 LAB
BACTERIA SPEC CULT: NORMAL
SERVICE CMNT-IMP: NORMAL

## 2022-03-20 PROBLEM — J15.9 COMMUNITY ACQUIRED BACTERIAL PNEUMONIA: Status: ACTIVE | Noted: 2022-03-11

## 2022-03-20 PROBLEM — A41.9 ACUTE SEPSIS (HCC): Status: ACTIVE | Noted: 2022-03-11

## 2022-03-21 ENCOUNTER — HOME CARE VISIT (OUTPATIENT)
Dept: HOME HEALTH SERVICES | Facility: HOME HEALTH | Age: 67
End: 2022-03-21
Payer: MEDICARE

## 2022-03-22 ENCOUNTER — HOME CARE VISIT (OUTPATIENT)
Dept: SCHEDULING | Facility: HOME HEALTH | Age: 67
End: 2022-03-22
Payer: MEDICARE

## 2022-03-22 PROCEDURE — G0157 HHC PT ASSISTANT EA 15: HCPCS

## 2022-03-23 NOTE — HOME HEALTH
SUBJECTIVE: Patient states she is having a better day. CAREGIVER INVOLVEMENT/ASSISTANCE NEEDED FOR: Catina Claros, daughter, assist with cooking  HOME HEALTH SUPPLIES BY TYPE AND QUANTITY ORDERED/DELIVERED THIS VISIT INCLUDE: none  OBJECTIVE:  See interventions. ASSESSMENT OF PROGRESS TOWARD GOALS:   Pt progressing towards goal of improving MMT as noted by able to complete COPD as instructed by therapist  PATIENT RESPONSE TO TREATMENT:  Pt completed COPD Protocol with small SOB noted, no pain noted  PATIENT LEVEL OF UNDERSTANDING OF EDUCATION PROVIDED: Pt states understanding of COPD protocol exercises and that she needs to perform once daily. CONTINUED NEED FOR THE FOLLOWING SKILLS: CONT PT to address MMT, giat training, balance, bed mob, transfers and other goals. PLAN FOR NEXT VISIT: Next visit continue to progress COPD, increase gait distance.   THE FOLLOWING DISCHARGE PLANNING WAS DISCUSSED WITH THE PATIENT/CAREGIVER: DC from PT when goals are met

## 2022-03-24 ENCOUNTER — HOME CARE VISIT (OUTPATIENT)
Dept: SCHEDULING | Facility: HOME HEALTH | Age: 67
End: 2022-03-24
Payer: MEDICARE

## 2022-03-24 ENCOUNTER — HOME CARE VISIT (OUTPATIENT)
Dept: HOME HEALTH SERVICES | Facility: HOME HEALTH | Age: 67
End: 2022-03-24
Payer: MEDICARE

## 2022-03-24 VITALS
TEMPERATURE: 98.2 F | DIASTOLIC BLOOD PRESSURE: 82 MMHG | OXYGEN SATURATION: 100 % | SYSTOLIC BLOOD PRESSURE: 128 MMHG | HEART RATE: 82 BPM

## 2022-03-24 PROCEDURE — G0152 HHCP-SERV OF OT,EA 15 MIN: HCPCS

## 2022-03-24 NOTE — Clinical Note
Mrs. Chelsea Adams has good rehab potential to increase her strength, endurance and functional mobility for increased independence within her home. Pt is currently requiring minimal assistance for bathing due to increased fatigue and in ability to utilize her tub shower unit. Pt presents with supervision for ambulating short house hold distances and toilet transfers. Pt has not been able to access her tub shower unit since returning home and daughter to obtain tub transfer bench. Pt and her family are agreeable to continued home health occupational therapy services to increase her safety and independence with her daily routine. PLAN: D/C 1w1, 2w2 with pt to discharge when goals are met or maximal potential achieved.

## 2022-03-24 NOTE — Clinical Note
Therapy Functional Score Assessment  Question                                            Score   Grooming                            1       Upper Dressing   1      Lower Dressing   1      Bathing                 5      Toilet Transfer                   1    Transfer                1            Ambulation                         1   Dyspnea                     4       Pain Interfering with activity        2  Est number therapy visits      5    Mrs. Chichi Alatorre has good rehab potential to increase her strength, endurance and functional mobility for increased independence within her home. Pt is currently requiring minimal assistance for bathing due to increased fatigue and in ability to utilize her tub shower unit. Pt presents with supervision for ambulating short house hold distances and toilet transfers. Pt has not been able to access her tub shower unit since returning home and daughter to obtain tub transfer bench. Pt and her family are agreeable to continued home health occupational therapy services to increase her safety and independence with her daily routine. PLAN: D/C 1w1, 2w2 with pt to discharge when goals are met or maximal potential achieved.

## 2022-03-24 NOTE — HOME HEALTH
Iman Mccauley is a 74 y.o. female who has past medical history of hypertension history of multiple small strokes MS diagnosed long time back now admitted for history of falls, multiple falls in the past. Patient was actively seeing neurologist in the area however he retired they do not remember the name. Recent H/o current illness: 74year old female presents with MD referral for [facility] s/p hospitalization due to sepsis after a fall  Past Medical Hx: Autoimmune disease ([facility] [address] MS  Gross hematuria  Hypertension  Kidney stones  MS (congenital mitral stenosis)  Stroke (cerebrum) ([facility] [address]    Medication Management: daughter manages medications with no medication changes reported since last reviewed by RN on 3/17/2022. Pt educated to continue as directed by MD.  Caregiver Involvement: Pt has family assists with medical appointments, medication management and some ADL support  PLOF: Pt lives in 2 story home with daughter. Pt PLOF used single point cane for ambulation with pts base level of function independent with all ADL's  Past Medical Hx: Autoimmune disease ([facility] [address] MS  Gross hematuria  Hypertension  Kidney stones  MS (congenital mitral stenosis)  Stroke (cerebrum) ([facility] [address]    SUBJECTIVE:Pt reports increased fatigue to day and feeling very tired. DME ORDERED/RECOMMENEDED: N/A    OBJECTIVE:  BATHING: Pt sponge bathing with minimal assistance due to increased fatigue with standing to shower. Pt daughter reports she will obtain  TOILETING: Pt supervision with toileting. UB DRESSING: Pt supervision/set up with upper body dressing with increased time. LB DRESSING: Pt supervision/set up with donning/doffing socks/shoes and pants. Pt requires increased time and completes in sitting except for proximal pant adjustments. GROOMING: Pt set up for grooming taks sitting on stool by sink  FEEDING: Pt independent with self feeding.      Modified Sidney RPE 5/10 after performing ambulation, transfers, and I/ADL assessment     OT instructed/demonstrated pt the following with good understanding:     IADL: Pt daughter helps with cleaning, cooking, transportation, shopping and laundry. AMBULATION: Pt supervision for ambulation short house hold distances using no assisitive device. Pt has cane and walker as needed for out of the house. EOB/BED TRANSFER: Pt independent with bed mobility  COUCH: Pt independent for sit to stand transfers from couch  TOILET: Pt supervision for toilet transfers  TUB SHOWER: Pt declinded tub transfer trial due to grandaughter being in bathroom. Pt and daughter educated on obtaining tub transfer bench for increased independence with bathing. PATIENT RESPONSE TO TREATMENT: Pt responded well to home health occupational therapy assessment with need for seated rest breaks throughout. Pt declining trialing shower transfers dur to granddaughter being in the bathroom and the \"up stairs being a mess. \"    PATIENT EDUCATION PROVIDED THIS VISIT: ANASTACIOE HEP, OT role, energy conservation, fall prevention/safety training ADL/IADL tasks, continue diet and medications as instructed per MD, consult MD or urgent care for medical assistance as opposed to ER unless situation emergent. PATIENT LEVEL OF UNDERSTANDING OF EDUCATION PROVIDED: Pt able to teach back role of occupaitonal therapy with good understanding. She was able to teach back energy conservation stratagies she can use in her daily routine with handout assist.     REHAB POTENTIAL: Mrs. Homa Ordoñez has good rehab potential to increase her strength, endurance and functional mobility for increased independence within her home. Pt is currently requriing minimal assistance for bathing due to increased faitgue and in ability to utilize her tub shower unit. Pt presents with supervision for ambulating short house hold distances and toiet transfers. Pt has not been able to access her tub shower unit since returning home and daughter to obtain tub transfer bench.  Pt and her family are agreeable to conitnued home health occupational therapy serivces to increase her safety and independence with her daily routine. HOME EXERCISE PROGRAM: energy conservation while performing bathing, dressing, and setup such as set clothing out night before, gather items required to perform task in one trip, sit while performing tasks, take rest breaks as needed, perform shower/bathing on days with no other appointments or activities scheduled, etc. pt provided with handout. Pt provided with handuout. CONTINUED NEED FOR THE FOLLOWING SKILLS: HH OT is medically necessary to address decreased functional strength,decreased independence and safety with functional transfers, decreased independence and safety performing ADL/IADL tasks, decreased activity and standing tolerance, decreased functional endurance, and impaired balance in order to improve functional independence, obtain set goals, reduce risk of falls, reduce pain, improve quality of life, and return to PLOF. ASSESSMENT: Pt presents with increased fatigue at rest wosening with light activity. Pt reports Modified Sidney scale rating of 5/10 with light activity. Pt with decreased functional BUE strength with pt strength grossly 4-/5. Pt unable to access her tub shower unit due to increased faitgue for completion of standing bathing activities. She has required minimal assistance for self bathing as a result of her decreased endurance. Pt is set up/supervision for other ADLs/functional mobility due to her decreased activity tolerance to gather items needed for task and complete successfully. Pt would benifit from education and implamentation of energy conservation techniques within her daily routine. Pt and her family educated on tub transfer bench option and agreeable to obtain to increase her functional independence with bathroom mobility for increased independece with bathing.     Skilled Care Provided: Pt completed full occupational therapy assessment to include balance, coordination, strengthening, ADL education/training, functional mobility and home safety. PLAN: D/C 1w1, 2w2 with pt to discharge when goals are met or maximal potential acheived.

## 2022-03-25 ENCOUNTER — HOME CARE VISIT (OUTPATIENT)
Dept: HOME HEALTH SERVICES | Facility: HOME HEALTH | Age: 67
End: 2022-03-25
Payer: MEDICARE

## 2022-03-29 ENCOUNTER — HOME CARE VISIT (OUTPATIENT)
Dept: HOME HEALTH SERVICES | Facility: HOME HEALTH | Age: 67
End: 2022-03-29
Payer: MEDICARE

## 2022-03-30 ENCOUNTER — HOME CARE VISIT (OUTPATIENT)
Dept: SCHEDULING | Facility: HOME HEALTH | Age: 67
End: 2022-03-30
Payer: MEDICARE

## 2022-03-30 VITALS — TEMPERATURE: 97.3 F | DIASTOLIC BLOOD PRESSURE: 120 MMHG | SYSTOLIC BLOOD PRESSURE: 213 MMHG | RESPIRATION RATE: 17 BRPM

## 2022-03-30 VITALS — DIASTOLIC BLOOD PRESSURE: 90 MMHG | SYSTOLIC BLOOD PRESSURE: 170 MMHG | TEMPERATURE: 97.3 F | HEART RATE: 70 BPM

## 2022-03-30 PROCEDURE — G0158 HHC OT ASSISTANT EA 15: HCPCS

## 2022-03-30 PROCEDURE — G0157 HHC PT ASSISTANT EA 15: HCPCS

## 2022-03-30 NOTE — HOME HEALTH
SUBJECTIVE:  Pt reports that she has had diarrhea all day from something that she ate, that she feel asleep forgetting to take BP meds, and that she hasn't taken her BP medication yet (time is 13:00). MAGDIEL Lane Nathaniel states that pt's BP high today: /100 with electric BP cuff. Pt c/o lightheadedness and blurred vision, and that she has to amb upstairs to get her BP med. Pt reports that MS has been in remission. CAREGIVER INVOLVEMENT/ASSISTANCE NEEDED FOR:  Pt's daughter assists w ADLs, medications, meals, and transportation. HOME HEALTH SUPPLIES by type and quantity ordered/delivered this visit include: None    OBJECTIVE: See Interventions    As pt c/o lightheadedness and blurred vision, amb w pt up and down 14 steps w CGA w pt amb step-to gait holding 1 handrail with total gait >100 feet w VCing to  feet with stepping up on the steps and since her shoes were slide on without a heel, VCs for careful foot placement on stairs. Patient education provided this visit:  See vital signs for values. Pt educated on the negative impact on her body and blood vessels with skipped BP meds and that since she has PMH if several mini-strokes that she should be very dilligent with taking BP meds on time every evening as perscribed; and on hydration and BRAT diet w diarrhea. Patient level of understanding of education provided:  Pt reported that she did not know that skipping BP meds caused problems. Will need to educate again as seemed not to fully understand education. Patient response to treatment:  Pt states that she feels very tired from all the amb and seated ther ex and wants to go lie down. ASSESSMENT AND PROGRESS TOWARD GOALS:    Progressing fair toward goals for gait, balance, stairs, and transfer w today's limitations from bout of diarrhea.   Pt was able to amb w step-to gait for 14 steps with 1 handrail w CGA & VCs for safety w stair steps due to lightheadedness with total amb distance for >100 feet to get BP meds; and pt was able to perform 5 seated BLE ther ex x 10 reps w sig increase in fatigue. Patient continues to have deficits in gait, balance, stairs, and transfer. Patient will benefit from continued intervention with progression of all PT goals to improve functional independence, prevent falls, decrease burden of care, and improve quality of life. PLAN FOR NEXT VISIT:  Check BP first thing and ask about diarrhea and consistency w BP meds. If BP high, call PCP. DISCHARGE PLANNING was discussed with the pt/caregiver:   Frequency: 1wk1, 2wk3, 1wk1, with 1wk1, 2wk1, 1wk1 remaining with anticipated DC on 4/11.

## 2022-03-30 NOTE — HOME HEALTH
SUBJECTIVE: Pt report she is having diarrhea today. PT BP elevated. PTA notified upon arrival and will monitor BP. Will call MD if BP does not decrease  . CAREGIVER INVOLVEMENT/ASSISTANCE NEEDED FOR: Pt has family who assists with ADLs/IADLs as needed  . HOME HEALTH SUPPLIES BY TYPE AND QUANTITY ORDERED/DELIVERED THIS VISIT INCLUDE: NONE   . OBJECTIVE:  See interventions. .  Patient education provided this visit:  Pt educated on importance of taking BP meds daily to maintain stable blood pressure. Pt directed in pursed lipped breathing to aid in relaxing pt in attempts to stabilize BP. Energy conservation techniques during ADLs and IADLs  reviewed   . Patient level of understanding of education provided: Pt in agreeance to education provided by MAGDIEL. Marj Crumble RESPONSE TO TREATMENT: Pt reports having interment dizziness with changes in positions   . ASSESSMENT OF PROGRESS TOWARD GOALS: Pt able to employ pursed lipped breathing techniques following education provided by MAGDIEL  . Pt able to verbalize understanding and identify energy conservation techniques that are utilized during ADLs/IADLs   . CONTINUED NEED FOR THE FOLLOWING SKILLS: HH OT is medically necessary to address, decreased functional strength,  decreased independence and safety with functional transfers, decreased independence and safety performing ADL/IADL tasks, decreased activity and standing tolerance, decreased functional endurance, obtain set goals, reduce risk of falls, reduce pain, improve quality of life, and return to PLOF. Marj Crumble PLAN FOR NEXT VISIT:MAGDIEL will address ADL training.    .   THE FOLLOWING DISCHARGE PLANNING WAS DISCUSSED WITH THE PATIENT/CAREGIVER: 1X1, 2X1 , anticpated DC 4/7 with pt to discharge when goals are met or maximal potential

## 2022-03-31 ENCOUNTER — HOME CARE VISIT (OUTPATIENT)
Dept: HOME HEALTH SERVICES | Facility: HOME HEALTH | Age: 67
End: 2022-03-31
Payer: MEDICARE

## 2022-03-31 NOTE — CASE COMMUNICATION
At [facility] [employer], informed Rehab Manager Ana Schilling, [address], [place], and PT Delicia Reynolds of high BP, Missed Visits for PT & SN, and that pt doesn't seem to comprehend that the BP meds are very nessessary to prevent further CVAs. LPN Sergio Grissom reported trying to see multiple times last week and that he would arrived when agreed upon with pt's daughter, but no one will answer the door despite 3  cars in driveway. Stephie Rodríguez asked LPN Ladonna Lisa to come out when VELOZ and LPTA treat on Friday 4/1/22, and he said that he would. Informed all that pt asked for no visit on Friday as her family is taking her out for grocery shopping, but declined visits for today 3/31/22.

## 2022-04-01 ENCOUNTER — HOME CARE VISIT (OUTPATIENT)
Dept: SCHEDULING | Facility: HOME HEALTH | Age: 67
End: 2022-04-01
Payer: MEDICARE

## 2022-04-01 ENCOUNTER — HOME CARE VISIT (OUTPATIENT)
Dept: HOME HEALTH SERVICES | Facility: HOME HEALTH | Age: 67
End: 2022-04-01
Payer: MEDICARE

## 2022-04-01 PROCEDURE — G0158 HHC OT ASSISTANT EA 15: HCPCS

## 2022-04-01 NOTE — CASE COMMUNICATION
After setting visit date and time, arrived today to find pt not at home despite VELOZ informing pt that LPTA would be arriving soon. VELOZ stated that pt didn't want pt and had an errand to run. This is pt's second missed visit with PT, has one missed visit with SN, and LPN Arabella Jd stated that he has had a problem trying to see this patient w several calls and refusals. Pt's BP as reported by John Noriega was 200/96 today. Will inf orm care team of pt being away from home several times and missing visits w PT & SN. Will call to inform PCP of cont high BP and missed visits and away from home several times. Will ask PT Mis Mathews for reassesment.

## 2022-04-01 NOTE — CASE COMMUNICATION
Called PCP, spoke w Hall Aschoff 2 missed visits with PT, 1 (probably 2 as pt NAH today) MVs for SN, and pt not at home several times.

## 2022-04-01 NOTE — HOME HEALTH
After setting visit date and time, arrived today to find pt not at home despite VELOZ informing pt that LPTA would be arriving soon. VELOZ stated that pt didn't want pt and had an errand to run. This is pt's second missed visit with PT, has one missed visit with SN, and LPN Kristi Leung stated that he has had a problem trying to see this patient w several calls and refusals. Pt's BP as reported by Ezequiel Carranza was 200/96 today.  Will inform care team of pt being away from home several times and missing visits w PT & SN.

## 2022-04-01 NOTE — CASE COMMUNICATION
Pt has had 2 missed visits w PT and 1 w SN due to leaving home for \"errands\". Today Pinky told pt LPTA was coming soon and pt still was either not at home or wouldn't answer the door. W last visit w pt BP was over 200/100 and pt hadn't taken BP meds from last evening. Explained thoroughly that consequences of uncontrolled BP and the conncetion to PMH of several small CVA's. Pt didn't seem concerned. Pinky reported by text that BP  was 200/96. She called PCP and left message. What would you like to do about next week with the total of 3 missed visits?

## 2022-04-03 VITALS
RESPIRATION RATE: 17 BRPM | HEART RATE: 68 BPM | DIASTOLIC BLOOD PRESSURE: 96 MMHG | TEMPERATURE: 97.3 F | SYSTOLIC BLOOD PRESSURE: 200 MMHG

## 2022-04-04 ENCOUNTER — HOME CARE VISIT (OUTPATIENT)
Dept: SCHEDULING | Facility: HOME HEALTH | Age: 67
End: 2022-04-04
Payer: MEDICARE

## 2022-04-04 VITALS
SYSTOLIC BLOOD PRESSURE: 166 MMHG | HEART RATE: 77 BPM | OXYGEN SATURATION: 96 % | DIASTOLIC BLOOD PRESSURE: 84 MMHG | TEMPERATURE: 98.1 F

## 2022-04-04 PROCEDURE — G0152 HHCP-SERV OF OT,EA 15 MIN: HCPCS

## 2022-04-04 NOTE — HOME HEALTH
SUBJECTIVE: Pt reports feeling well and no wanting further home health services. CAREGIVER INVOLVEMENT/ASSISTANCE NEEDED FOR: Pt daughter assists with medications, IADLs, transportation and ADLs as needed    HOME HEALTH SUPPLIES BY TYPE AND QUANTITY ORDERED/DELIVERED THIS VISIT INCLUDE: N/A    OBJECTIVE: See interventions    PATIENT RESPONSE TO TREATMENT: Pt reports feeling closer to her baseline level of independence and no wanting further home health services. Called NP Sherley Zuñiga and spoke with Maylin Willams about pt high BP and expressed message to be sent to NP. Pt NP called back and assured she would call daughter to review medications and plan of care. PATIENT LEVEL OF UNDERSTANDING OF EDUCATION PROVIDED: Pt educated on signs and symptoms of high BP and able to teach back with good understanding. Pt has personal BP cuff and educated to monitor daily. Pt able to teach back importance of taking medications on time and as schedule. ASSESSMENT OF PROGRESS TOWARD GOALS: Mrs. Cornelia Wellington is requesting to be discharged from home health occupational therapy services. Pt has made progress towards her goals but unable to achieve all goals at time of discharge request. Pt unable to meet her functional mobility goal of dynamic reaching for items at different levels and tub transfers due to her declining to attempt and increased BP. Pt still has not obtained a shower chair but reports she will soon. Pt has not met all of her energy conservation goals with pt maintaining a modified loi scale rating of 5/10. Pt has however progressed with expressing energy conservation techniques and utilizing in her daily routine. Pt has not met her ADL goals due declining to bathe with OT however able to simulate with supervision. THE FOLLOWING DISCHARGE PLANNING WAS DISCUSSED WITH THE PATIENT/CAREGIVER: Pt requesting to be discharge from home health occupational therapy services.

## 2022-04-04 NOTE — HOME HEALTH
SUBJECTIVE: Pt voiced no concerns Pt agreeable following OT education  . CAREGIVER INVOLVEMENT/ASSISTANCE NEEDED FOR: Pt daughter who assists with ADLs/IADLs as needed  . HOME HEALTH SUPPLIES BY TYPE AND QUANTITY ORDERED/DELIVERED THIS VISIT INCLUDE: NONE   . OBJECTIVE:  See interventions. .  Patient education provided this visit:  Pt educated on benefit of using long handled sponge  . Patient level of understanding of education provided: Pt verbalized understanding to education provided by VELOZ. Thresa Pk Home exercise program: B UE strengthening against gravity for shoulder flexion/extension, overhead press, chest press, shoulder ab/adduction and elbow flexion/extension  . RESPONSE TO TREATMENT: Pt had a positive response to treatment with no increased complaints of pain  . ASSESSMENT OF PROGRESS TOWARD GOALS: Pt dynamic balance has improved to good . Pt Modified toilet transfer. Pt demonstrates impulsiveness that may increase fall risk. Pt is able to completed upper and lower body bathing and dressing with Supervision  . CONTINUED NEED FOR THE FOLLOWING SKILLS: HH OT is medically necessary to address decreased functional strength,  decreased independence and safety with functional transfers, decreased independence and safety performing ADL/IADL tasks, decreased functional endurance, and impaired balance in order to improve functional independence, obtain set goals, reduce risk of falls, improve quality of life, and return to PLOF. Thresa Abed PLAN FOR NEXT VISIT:VELOZ will address ADL goals.    .   THE FOLLOWING DISCHARGE PLANNING WAS DISCUSSED WITH THE PATIENT/CAREGIVER: Discharge to self and family under MD supervision once all goals have been met or patient has reached maximum potential.  Frequency remaining: 2X2

## 2022-04-04 NOTE — Clinical Note
Mrs. Fleurette Cogan is requesting to be discharged from home health occupational therapy services. Pt has made progress towards her goals but unable to achieve all goals at time of discharge request. Pt unable to meet her functional mobility goal of dynamic reaching for items at different levels and tub transfers due to her declining to attempt and increased BP. Pt still has not obtained a shower chair but reports she will soon. Pt has not met all of her energy conservation goals with pt maintaining a modified loi scale rating of 5/10. Pt has however progressed with expressing energy conservation techniques and utilizing in her daily routine. Pt has not met her ADL goals due declining to bathe with OT however able to simulate with supervision. Pt unable to meet BUE HEP strengthening goal due to lack of carry-over and treatment time for edcuation. Pt to discharge home with family support.

## 2022-04-04 NOTE — CASE COMMUNICATION
Avinash Matos called me back this morning and wants to DC therapy as her sisters are getting her out of the house to do things several times a week, ie the 4+ MVs.  I assumed she meant PT, but she might mean OT too. Kennstarr Kirby will be going out to do the DC this week. I am not sure what SN plans to do as she has had 2 MVs with them and despite her high, uncontrolled BP doesn't seem to really care about it.

## 2022-04-06 ENCOUNTER — HOME CARE VISIT (OUTPATIENT)
Dept: HOME HEALTH SERVICES | Facility: HOME HEALTH | Age: 67
End: 2022-04-06
Payer: MEDICARE

## 2022-04-07 ENCOUNTER — HOME CARE VISIT (OUTPATIENT)
Dept: SCHEDULING | Facility: HOME HEALTH | Age: 67
End: 2022-04-07
Payer: MEDICARE

## 2022-04-07 VITALS
OXYGEN SATURATION: 90 % | TEMPERATURE: 97.3 F | SYSTOLIC BLOOD PRESSURE: 150 MMHG | HEART RATE: 68 BPM | RESPIRATION RATE: 16 BRPM | DIASTOLIC BLOOD PRESSURE: 86 MMHG

## 2022-04-07 PROCEDURE — G0151 HHCP-SERV OF PT,EA 15 MIN: HCPCS

## 2022-04-07 NOTE — Clinical Note
Ms. Fuentes Galeano has been clinically discharged and documentation finalized for completion of PT discharge. Caregiver involvement: Pt daughter is primary caregiver at this time and has been assisting with medication management and medical appointments  Medications reconciled and all medications are available in the home this visit. The following education was provided regarding medications, medication interactions, and look a like medications: NA  Medications  are effective at this time. Home health supplies by type and quantity ordered/delivered this visit include: NA  Patient education provided this visit: safety with funcitonal mobility  Current Functional Status and progress towards goals:  Strength: Pt. BLE strength is 4-/5 which is an improvement from the initial evaluation strength of 3-/5. This allows the patient increased functional independence and mobility    BED MOBILITY: Pt. is independent with all bed mobility  which demonstrates an improvement from the initial evaluation of min A. This allows for the patient to be functionally more independent. TRANSFERS: Pt. is independent with all transfers which demonstrates and improvement from the initial evaluation score of min A. This allows the patient increased functional independence and mobility  GAIT/WC MOBILITY: Pt. is able to ambulate >100 feet outside over even and uneven surfaces with independence A with SPC AD. This represents an improvement from the initial evaluation of 50 feet with no AD on level surfaces with CGA/min A. STAIRS: 12+ stairs independently  BALANCE: Patient's final tinetti is 25/28 which is an improvement from the initial evaluation score of 17/28. This allows the patient to be functionally more independent and have a decreased fall risk  Progress toward goals: Patient has met all goals, see interventions for details. Pt. was able to return demonstrate all mobility training and HEP shown independently.    Patient response to treatment and education: Patient tolerated treatment well, with no complaint of new pain noted post treatment. Home exercise program: Patient has been given a HEP and patient/caregiver understand the HEP. Patient is doing the HEP 1/day as able  Continued need for the following skills:  NA patient is final DC from PT today   The following discharge planning was discussed with the pt/caregiver: DC from agency    Thank you for your referal of this patient.     Sincerely,    Kassie Rachel, MPT  Physical Therapist

## 2022-04-07 NOTE — HOME HEALTH
DC ACTIONS NARRATIVE  Pt. clinically discharged and documentation finalized for completion of PT discharge. Caregiver involvement: Pt daughter is primary caregiver at this time and has been assisting with medication management and medical appointments  Medications reconciled and all medications are available in the home this visit. The following education was provided regarding medications, medication interactions, and look a like medications: NA  Medications  are effective at this time. Home health supplies by type and quantity ordered/delivered this visit include: NA  Patient education provided this visit: safety with funcitonal mobility  Current Functional Status and progress towards goals:  Strength: Pt. BLE strength is 4-/5 which is an improvement from the initial evaluation strength of 3-/5. This allows the patient increased functional independence and mobility    BED MOBILITY: Pt. is independent with all bed mobility  which demonstrates an improvement from the initial evaluation of min A. This allows for the patient to be functionally more independent. TRANSFERS: Pt. is independent with all transfers which demonstrates and improvement from the initial evaluation score of min A. This allows the patient increased functional independence and mobility  GAIT/WC MOBILITY: Pt. is able to ambulate >100 feet outside over even and uneven surfaces with independence A with SPC AD. This represents an improvement from the initial evaluation of 50 feet with no AD on level surfaces with CGA/min A. STAIRS: 12+ stairs independently  BALANCE: Patient's final tinetti is 25/28 which is an improvement from the initial evaluation score of 17/28. This allows the patient to be functionally more independent and have a decreased fall risk  Progress toward goals: Patient has met all goals, see interventions for details. Pt. was able to return demonstrate all mobility training and HEP shown independently.    Patient response to treatment and education: Patient tolerated treatment well, with no complaint of new pain noted post treatment. Home exercise program: Patient has been given a HEP and patient/caregiver understand the HEP.  Patient is doing the HEP 1/day as able  Continued need for the following skills:  NA patient is final DC from PT today   The following discharge planning was discussed with the pt/caregiver: DC from agency
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
## 2022-07-26 ENCOUNTER — APPOINTMENT (OUTPATIENT)
Dept: GENERAL RADIOLOGY | Age: 67
End: 2022-07-26
Attending: STUDENT IN AN ORGANIZED HEALTH CARE EDUCATION/TRAINING PROGRAM
Payer: MEDICARE

## 2022-07-26 ENCOUNTER — HOSPITAL ENCOUNTER (EMERGENCY)
Age: 67
Discharge: HOME | End: 2022-07-26
Attending: STUDENT IN AN ORGANIZED HEALTH CARE EDUCATION/TRAINING PROGRAM
Payer: MEDICARE

## 2022-07-26 ENCOUNTER — APPOINTMENT (OUTPATIENT)
Dept: CT IMAGING | Age: 67
End: 2022-07-26
Attending: STUDENT IN AN ORGANIZED HEALTH CARE EDUCATION/TRAINING PROGRAM
Payer: MEDICARE

## 2022-07-26 VITALS
OXYGEN SATURATION: 100 % | TEMPERATURE: 97.1 F | RESPIRATION RATE: 15 BRPM | HEART RATE: 67 BPM | DIASTOLIC BLOOD PRESSURE: 82 MMHG | SYSTOLIC BLOOD PRESSURE: 183 MMHG
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
## 2022-07-26 LAB
ANION GAP SERPL CALC-SCNC: 6 MMOL/L (ref 3–18)
BASOPHILS # BLD: 0.1 K/UL (ref 0–0.1)
BASOPHILS NFR BLD: 1 % (ref 0–2)
BUN SERPL-MCNC: 22 MG/DL (ref 7–18)
BUN/CREAT SERPL: 16 (ref 12–20)
CALCIUM SERPL-MCNC: 9.2 MG/DL (ref 8.5–10.1)
CHLORIDE SERPL-SCNC: 107 MMOL/L (ref 100–111)
CO2 SERPL-SCNC: 29 MMOL/L (ref 21–32)
CREAT SERPL-MCNC: 1.36 MG/DL (ref 0.6–1.3)
DIFFERENTIAL METHOD BLD: ABNORMAL
EOSINOPHIL # BLD: 0.2 K/UL (ref 0–0.4)
EOSINOPHIL NFR BLD: 2 % (ref 0–5)
ERYTHROCYTE [DISTWIDTH] IN BLOOD BY AUTOMATED COUNT: 15.4 % (ref 11.6–14.5)
GLUCOSE SERPL-MCNC: 89 MG/DL (ref 74–99)
HCT VFR BLD AUTO: 38.8 % (ref 35–45)
HGB BLD-MCNC: 12.8 G/DL (ref 12–16)
IMM GRANULOCYTES # BLD AUTO: 0 K/UL (ref 0–0.04)
IMM GRANULOCYTES NFR BLD AUTO: 0 % (ref 0–0.5)
LYMPHOCYTES # BLD: 1.9 K/UL (ref 0.9–3.6)
LYMPHOCYTES NFR BLD: 27 % (ref 21–52)
MCH RBC QN AUTO: 30 PG (ref 24–34)
MCHC RBC AUTO-ENTMCNC: 33 G/DL (ref 31–37)
MCV RBC AUTO: 90.9 FL (ref 78–100)
MONOCYTES # BLD: 0.4 K/UL (ref 0.05–1.2)
MONOCYTES NFR BLD: 6 % (ref 3–10)
NEUTS SEG # BLD: 4.5 K/UL (ref 1.8–8)
NEUTS SEG NFR BLD: 64 % (ref 40–73)
NRBC # BLD: 0 K/UL (ref 0–0.01)
NRBC BLD-RTO: 0 PER 100 WBC
PLATELET # BLD AUTO: 286 K/UL (ref 135–420)
PMV BLD AUTO: 10.5 FL (ref 9.2–11.8)
POTASSIUM SERPL-SCNC: 4 MMOL/L (ref 3.5–5.5)
RBC # BLD AUTO: 4.27 M/UL (ref 4.2–5.3)
SODIUM SERPL-SCNC: 142 MMOL/L (ref 136–145)
WBC # BLD AUTO: 7 K/UL (ref 4.6–13.2)

## 2022-07-26 PROCEDURE — 73140 X-RAY EXAM OF FINGER(S): CPT

## 2022-07-26 PROCEDURE — 96361 HYDRATE IV INFUSION ADD-ON: CPT

## 2022-07-26 PROCEDURE — 72125 CT NECK SPINE W/O DYE: CPT

## 2022-07-26 PROCEDURE — 85025 COMPLETE CBC W/AUTO DIFF WBC: CPT

## 2022-07-26 PROCEDURE — 99285 EMERGENCY DEPT VISIT HI MDM: CPT

## 2022-07-26 PROCEDURE — 73562 X-RAY EXAM OF KNEE 3: CPT

## 2022-07-26 PROCEDURE — 70450 CT HEAD/BRAIN W/O DYE: CPT

## 2022-07-26 PROCEDURE — 93005 ELECTROCARDIOGRAM TRACING: CPT

## 2022-07-26 PROCEDURE — 96360 HYDRATION IV INFUSION INIT: CPT

## 2022-07-26 PROCEDURE — 74011250636 HC RX REV CODE- 250/636: Performed by: STUDENT IN AN ORGANIZED HEALTH CARE EDUCATION/TRAINING PROGRAM

## 2022-07-26 PROCEDURE — 74011250637 HC RX REV CODE- 250/637: Performed by: STUDENT IN AN ORGANIZED HEALTH CARE EDUCATION/TRAINING PROGRAM

## 2022-07-26 PROCEDURE — 80048 BASIC METABOLIC PNL TOTAL CA: CPT

## 2022-07-26 RX ORDER — ACETAMINOPHEN 500 MG
1000 TABLET ORAL
Status: COMPLETED | OUTPATIENT
Start: 2022-07-26 | End: 2022-07-26

## 2022-07-26 RX ADMIN — SODIUM CHLORIDE 1000 ML: 9 INJECTION, SOLUTION INTRAVENOUS at 19:57

## 2022-07-26 RX ADMIN — ACETAMINOPHEN 1000 MG: 500 TABLET ORAL at 18:57

## 2022-07-26 NOTE — Clinical Note
Clarissa Holder was seen and treated in our emergency department on 7/26/2022. Patient's caregiver was in the emergency department with her while she was being treated.     Ryan Capone MD
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
## 2022-07-27 LAB
ATRIAL RATE: 67 BPM
CALCULATED P AXIS, ECG09: 68 DEGREES
CALCULATED R AXIS, ECG10: 71 DEGREES
CALCULATED T AXIS, ECG11: 66 DEGREES
DIAGNOSIS, 93000: NORMAL
P-R INTERVAL, ECG05: 126 MS
Q-T INTERVAL, ECG07: 384 MS
QRS DURATION, ECG06: 76 MS
QTC CALCULATION (BEZET), ECG08: 405 MS
VENTRICULAR RATE, ECG03: 67 BPM

## 2022-07-27 NOTE — DISCHARGE INSTRUCTIONS
Please continue to follow-up with your primary care doctor as needed. If you develop any sudden change in your symptoms including sudden/severe pain, repeated falls, nausea/vomiting, or any other sudden/severe change in condition please return immediately emerged department further evaluation and treatment.

## 2023-01-01 ENCOUNTER — HOSPITAL ENCOUNTER (INPATIENT)
Facility: HOSPITAL | Age: 68
LOS: 1 days | DRG: 356 | End: 2023-06-16
Attending: EMERGENCY MEDICINE | Admitting: COLON & RECTAL SURGERY
Payer: MEDICARE

## 2023-01-01 ENCOUNTER — APPOINTMENT (OUTPATIENT)
Facility: HOSPITAL | Age: 68
DRG: 356 | End: 2023-01-01
Payer: MEDICARE

## 2023-01-01 VITALS
RESPIRATION RATE: 20 BRPM | TEMPERATURE: 98.2 F | WEIGHT: 113 LBS | SYSTOLIC BLOOD PRESSURE: 153 MMHG | OXYGEN SATURATION: 98 % | BODY MASS INDEX: 18.16 KG/M2 | DIASTOLIC BLOOD PRESSURE: 70 MMHG | HEIGHT: 66 IN | HEART RATE: 100 BPM

## 2023-01-01 DIAGNOSIS — R10.9 ABDOMINAL PAIN, UNSPECIFIED ABDOMINAL LOCATION: Primary | ICD-10-CM

## 2023-01-01 DIAGNOSIS — N17.9 AKI (ACUTE KIDNEY INJURY) (HCC): ICD-10-CM

## 2023-01-01 DIAGNOSIS — I21.4 NSTEMI (NON-ST ELEVATED MYOCARDIAL INFARCTION) (HCC): ICD-10-CM

## 2023-01-01 DIAGNOSIS — K63.1 BOWEL PERFORATION (HCC): ICD-10-CM

## 2023-01-01 LAB
ALBUMIN SERPL-MCNC: 2.3 G/DL (ref 3.4–5)
ALBUMIN/GLOB SERPL: 0.5 (ref 0.8–1.7)
ALP SERPL-CCNC: 124 U/L (ref 45–117)
ALT SERPL-CCNC: 44 U/L (ref 13–56)
ANION GAP SERPL CALC-SCNC: 15 MMOL/L (ref 3–18)
APTT PPP: 50.6 SEC (ref 23–36.4)
AST SERPL-CCNC: 90 U/L (ref 10–38)
BASOPHILS # BLD: 0 K/UL (ref 0–0.1)
BASOPHILS NFR BLD: 0 % (ref 0–2)
BILIRUB SERPL-MCNC: 0.6 MG/DL (ref 0.2–1)
BUN SERPL-MCNC: 41 MG/DL (ref 7–18)
BUN/CREAT SERPL: 18 (ref 12–20)
CALCIUM SERPL-MCNC: 8.9 MG/DL (ref 8.5–10.1)
CHLORIDE SERPL-SCNC: 107 MMOL/L (ref 100–111)
CO2 SERPL-SCNC: 16 MMOL/L (ref 21–32)
CREAT SERPL-MCNC: 2.34 MG/DL (ref 0.6–1.3)
DIFFERENTIAL METHOD BLD: ABNORMAL
EKG ATRIAL RATE: 107 BPM
EKG ATRIAL RATE: 118 BPM
EKG DIAGNOSIS: NORMAL
EKG DIAGNOSIS: NORMAL
EKG P AXIS: 60 DEGREES
EKG P AXIS: 67 DEGREES
EKG P-R INTERVAL: 116 MS
EKG P-R INTERVAL: 116 MS
EKG Q-T INTERVAL: 316 MS
EKG Q-T INTERVAL: 320 MS
EKG QRS DURATION: 84 MS
EKG QRS DURATION: 90 MS
EKG QTC CALCULATION (BAZETT): 427 MS
EKG QTC CALCULATION (BAZETT): 442 MS
EKG R AXIS: 54 DEGREES
EKG R AXIS: 58 DEGREES
EKG T AXIS: 43 DEGREES
EKG T AXIS: 46 DEGREES
EKG VENTRICULAR RATE: 107 BPM
EKG VENTRICULAR RATE: 118 BPM
EOSINOPHIL # BLD: 0 K/UL (ref 0–0.4)
EOSINOPHIL NFR BLD: 0 % (ref 0–5)
ERYTHROCYTE [DISTWIDTH] IN BLOOD BY AUTOMATED COUNT: 14.1 % (ref 11.6–14.5)
FLUAV RNA SPEC QL NAA+PROBE: NOT DETECTED
FLUBV RNA SPEC QL NAA+PROBE: NOT DETECTED
GLOBULIN SER CALC-MCNC: 4.4 G/DL (ref 2–4)
GLUCOSE SERPL-MCNC: 89 MG/DL (ref 74–99)
HCT VFR BLD AUTO: 40.8 % (ref 35–45)
HGB BLD-MCNC: 13.5 G/DL (ref 12–16)
IMM GRANULOCYTES # BLD AUTO: 0 K/UL (ref 0–0.04)
IMM GRANULOCYTES NFR BLD AUTO: 0 % (ref 0–0.5)
INR PPP: 1.4 (ref 0.8–1.2)
LACTATE SERPL-SCNC: 3.4 MMOL/L (ref 0.4–2)
LACTATE SERPL-SCNC: 4.3 MMOL/L (ref 0.4–2)
LACTATE SERPL-SCNC: 5 MMOL/L (ref 0.4–2)
LACTATE SERPL-SCNC: 5.9 MMOL/L (ref 0.4–2)
LIPASE SERPL-CCNC: 92 U/L (ref 73–393)
LYMPHOCYTES # BLD: 0.2 K/UL (ref 0.9–3.6)
LYMPHOCYTES NFR BLD: 3 % (ref 21–52)
MCH RBC QN AUTO: 30.2 PG (ref 24–34)
MCHC RBC AUTO-ENTMCNC: 33.1 G/DL (ref 31–37)
MCV RBC AUTO: 91.3 FL (ref 78–100)
METAMYELOCYTES NFR BLD MANUAL: 3 %
MONOCYTES # BLD: 0.1 K/UL (ref 0.05–1.2)
MONOCYTES NFR BLD: 1 % (ref 3–10)
NEUTS BAND NFR BLD MANUAL: 29 %
NEUTS SEG # BLD: 5.4 K/UL (ref 1.8–8)
NEUTS SEG NFR BLD: 64 % (ref 40–73)
NRBC # BLD: 0.02 K/UL (ref 0–0.01)
NRBC BLD-RTO: 0.3 PER 100 WBC
PLATELET # BLD AUTO: 370 K/UL (ref 135–420)
PLATELET COMMENT: ABNORMAL
PMV BLD AUTO: 10.1 FL (ref 9.2–11.8)
POTASSIUM SERPL-SCNC: 3.5 MMOL/L (ref 3.5–5.5)
PROT SERPL-MCNC: 6.7 G/DL (ref 6.4–8.2)
PROTHROMBIN TIME: 17.3 SEC (ref 11.5–15.2)
RBC # BLD AUTO: 4.47 M/UL (ref 4.2–5.3)
RBC MORPH BLD: ABNORMAL
SARS-COV-2 RNA RESP QL NAA+PROBE: NOT DETECTED
SODIUM SERPL-SCNC: 138 MMOL/L (ref 136–145)
TROPONIN I SERPL HS-MCNC: 2734 NG/L (ref 0–54)
TROPONIN I SERPL HS-MCNC: 314 NG/L (ref 0–54)
TROPONIN I SERPL HS-MCNC: 648 NG/L (ref 0–54)
WBC # BLD AUTO: 5.8 K/UL (ref 4.6–13.2)

## 2023-01-01 PROCEDURE — 99222 1ST HOSP IP/OBS MODERATE 55: CPT | Performed by: STUDENT IN AN ORGANIZED HEALTH CARE EDUCATION/TRAINING PROGRAM

## 2023-01-01 PROCEDURE — 96375 TX/PRO/DX INJ NEW DRUG ADDON: CPT

## 2023-01-01 PROCEDURE — 86900 BLOOD TYPING SEROLOGIC ABO: CPT

## 2023-01-01 PROCEDURE — 0WJG0ZZ INSPECTION OF PERITONEAL CAVITY, OPEN APPROACH: ICD-10-PCS | Performed by: COLON & RECTAL SURGERY

## 2023-01-01 PROCEDURE — 86850 RBC ANTIBODY SCREEN: CPT

## 2023-01-01 PROCEDURE — 3700000001 HC ADD 15 MINUTES (ANESTHESIA): Performed by: COLON & RECTAL SURGERY

## 2023-01-01 PROCEDURE — 2709999900 HC NON-CHARGEABLE SUPPLY: Performed by: COLON & RECTAL SURGERY

## 2023-01-01 PROCEDURE — 6370000000 HC RX 637 (ALT 250 FOR IP): Performed by: EMERGENCY MEDICINE

## 2023-01-01 PROCEDURE — 2580000003 HC RX 258: Performed by: EMERGENCY MEDICINE

## 2023-01-01 PROCEDURE — 93005 ELECTROCARDIOGRAM TRACING: CPT | Performed by: EMERGENCY MEDICINE

## 2023-01-01 PROCEDURE — 2500000003 HC RX 250 WO HCPCS: Performed by: EMERGENCY MEDICINE

## 2023-01-01 PROCEDURE — 99285 EMERGENCY DEPT VISIT HI MDM: CPT

## 2023-01-01 PROCEDURE — 85025 COMPLETE CBC W/AUTO DIFF WBC: CPT

## 2023-01-01 PROCEDURE — 83690 ASSAY OF LIPASE: CPT

## 2023-01-01 PROCEDURE — 6360000002 HC RX W HCPCS: Performed by: EMERGENCY MEDICINE

## 2023-01-01 PROCEDURE — 80053 COMPREHEN METABOLIC PANEL: CPT

## 2023-01-01 PROCEDURE — 83605 ASSAY OF LACTIC ACID: CPT

## 2023-01-01 PROCEDURE — 3700000000 HC ANESTHESIA ATTENDED CARE: Performed by: COLON & RECTAL SURGERY

## 2023-01-01 PROCEDURE — 2140000001 HC CVICU INTERMEDIATE R&B

## 2023-01-01 PROCEDURE — 87040 BLOOD CULTURE FOR BACTERIA: CPT

## 2023-01-01 PROCEDURE — 74176 CT ABD & PELVIS W/O CONTRAST: CPT

## 2023-01-01 PROCEDURE — 87636 SARSCOV2 & INF A&B AMP PRB: CPT

## 2023-01-01 PROCEDURE — 85730 THROMBOPLASTIN TIME PARTIAL: CPT

## 2023-01-01 PROCEDURE — 93005 ELECTROCARDIOGRAM TRACING: CPT | Performed by: COLON & RECTAL SURGERY

## 2023-01-01 PROCEDURE — 71045 X-RAY EXAM CHEST 1 VIEW: CPT

## 2023-01-01 PROCEDURE — 85610 PROTHROMBIN TIME: CPT

## 2023-01-01 PROCEDURE — 49000 EXPLORATION OF ABDOMEN: CPT | Performed by: COLON & RECTAL SURGERY

## 2023-01-01 PROCEDURE — 3600000002 HC SURGERY LEVEL 2 BASE: Performed by: COLON & RECTAL SURGERY

## 2023-01-01 PROCEDURE — 96365 THER/PROPH/DIAG IV INF INIT: CPT

## 2023-01-01 PROCEDURE — 96361 HYDRATE IV INFUSION ADD-ON: CPT

## 2023-01-01 PROCEDURE — 96376 TX/PRO/DX INJ SAME DRUG ADON: CPT

## 2023-01-01 PROCEDURE — 99223 1ST HOSP IP/OBS HIGH 75: CPT | Performed by: COLON & RECTAL SURGERY

## 2023-01-01 PROCEDURE — A4217 STERILE WATER/SALINE, 500 ML: HCPCS | Performed by: COLON & RECTAL SURGERY

## 2023-01-01 PROCEDURE — 86901 BLOOD TYPING SEROLOGIC RH(D): CPT

## 2023-01-01 PROCEDURE — 2580000003 HC RX 258: Performed by: COLON & RECTAL SURGERY

## 2023-01-01 PROCEDURE — 3600000012 HC SURGERY LEVEL 2 ADDTL 15MIN: Performed by: COLON & RECTAL SURGERY

## 2023-01-01 PROCEDURE — 84484 ASSAY OF TROPONIN QUANT: CPT

## 2023-01-01 RX ORDER — 0.9 % SODIUM CHLORIDE 0.9 %
1000 INTRAVENOUS SOLUTION INTRAVENOUS ONCE
Status: COMPLETED | OUTPATIENT
Start: 2023-01-01 | End: 2023-01-01

## 2023-01-01 RX ORDER — ASPIRIN 325 MG
325 TABLET ORAL DAILY
Status: CANCELLED | OUTPATIENT
Start: 2023-01-01

## 2023-01-01 RX ORDER — HEPARIN SODIUM 10000 [USP'U]/100ML
5-30 INJECTION, SOLUTION INTRAVENOUS CONTINUOUS
Status: DISCONTINUED | OUTPATIENT
Start: 2023-01-01 | End: 2023-01-01

## 2023-01-01 RX ORDER — METOPROLOL SUCCINATE 25 MG/1
100 TABLET, EXTENDED RELEASE ORAL DAILY
Status: CANCELLED | OUTPATIENT
Start: 2023-01-01

## 2023-01-01 RX ORDER — ASPIRIN 81 MG/1
324 TABLET, CHEWABLE ORAL ONCE
Status: COMPLETED | OUTPATIENT
Start: 2023-01-01 | End: 2023-01-01

## 2023-01-01 RX ORDER — NICOTINE 21 MG/24HR
1 PATCH, TRANSDERMAL 24 HOURS TRANSDERMAL DAILY
Status: DISCONTINUED | OUTPATIENT
Start: 2023-01-01 | End: 2023-01-01 | Stop reason: HOSPADM

## 2023-01-01 RX ORDER — HEPARIN SODIUM 1000 [USP'U]/ML
30 INJECTION, SOLUTION INTRAVENOUS; SUBCUTANEOUS PRN
Status: DISCONTINUED | OUTPATIENT
Start: 2023-01-01 | End: 2023-01-01

## 2023-01-01 RX ORDER — ONDANSETRON 2 MG/ML
4 INJECTION INTRAMUSCULAR; INTRAVENOUS EVERY 6 HOURS PRN
Status: DISCONTINUED | OUTPATIENT
Start: 2023-01-01 | End: 2023-01-01 | Stop reason: HOSPADM

## 2023-01-01 RX ORDER — MORPHINE SULFATE 2 MG/ML
2 INJECTION, SOLUTION INTRAMUSCULAR; INTRAVENOUS
Status: DISCONTINUED | OUTPATIENT
Start: 2023-01-01 | End: 2023-01-01 | Stop reason: HOSPADM

## 2023-01-01 RX ORDER — HEPARIN SODIUM 1000 [USP'U]/ML
60 INJECTION, SOLUTION INTRAVENOUS; SUBCUTANEOUS PRN
Status: DISCONTINUED | OUTPATIENT
Start: 2023-01-01 | End: 2023-01-01

## 2023-01-01 RX ORDER — HEPARIN SODIUM 1000 [USP'U]/ML
60 INJECTION, SOLUTION INTRAVENOUS; SUBCUTANEOUS ONCE
Status: COMPLETED | OUTPATIENT
Start: 2023-01-01 | End: 2023-01-01

## 2023-01-01 RX ORDER — 0.9 % SODIUM CHLORIDE 0.9 %
1000 INTRAVENOUS SOLUTION INTRAVENOUS ONCE
Status: DISCONTINUED | OUTPATIENT
Start: 2023-01-01 | End: 2023-01-01 | Stop reason: HOSPADM

## 2023-01-01 RX ORDER — MORPHINE SULFATE 2 MG/ML
2 INJECTION, SOLUTION INTRAMUSCULAR; INTRAVENOUS ONCE
Status: COMPLETED | OUTPATIENT
Start: 2023-01-01 | End: 2023-01-01

## 2023-01-01 RX ORDER — SODIUM CHLORIDE 9 MG/ML
INJECTION, SOLUTION INTRAVENOUS CONTINUOUS
Status: DISCONTINUED | OUTPATIENT
Start: 2023-01-01 | End: 2023-01-01 | Stop reason: HOSPADM

## 2023-01-01 RX ORDER — ASPIRIN 81 MG/1
324 TABLET, CHEWABLE ORAL DAILY
Status: DISCONTINUED | OUTPATIENT
Start: 2023-01-01 | End: 2023-01-01

## 2023-01-01 RX ORDER — POLYETHYLENE GLYCOL 3350 17 G/17G
17 POWDER, FOR SOLUTION ORAL DAILY
Status: CANCELLED | OUTPATIENT
Start: 2023-01-01

## 2023-01-01 RX ORDER — DIPHENHYDRAMINE HYDROCHLORIDE 50 MG/ML
25 INJECTION INTRAMUSCULAR; INTRAVENOUS EVERY 6 HOURS PRN
Status: DISCONTINUED | OUTPATIENT
Start: 2023-01-01 | End: 2023-01-01 | Stop reason: HOSPADM

## 2023-01-01 RX ORDER — CLONAZEPAM 0.5 MG/1
0.5 TABLET ORAL 3 TIMES DAILY PRN
Status: CANCELLED | OUTPATIENT
Start: 2023-01-01

## 2023-01-01 RX ORDER — ATORVASTATIN CALCIUM 40 MG/1
80 TABLET, FILM COATED ORAL NIGHTLY
Status: CANCELLED | OUTPATIENT
Start: 2023-01-01

## 2023-01-01 RX ORDER — MORPHINE SULFATE 4 MG/ML
4 INJECTION, SOLUTION INTRAMUSCULAR; INTRAVENOUS ONCE
Status: COMPLETED | OUTPATIENT
Start: 2023-01-01 | End: 2023-01-01

## 2023-01-01 RX ORDER — LORAZEPAM 2 MG/ML
2 INJECTION INTRAMUSCULAR ONCE
Status: COMPLETED | OUTPATIENT
Start: 2023-01-01 | End: 2023-01-01

## 2023-01-01 RX ORDER — DIPHENHYDRAMINE HYDROCHLORIDE 50 MG/ML
25 INJECTION INTRAMUSCULAR; INTRAVENOUS
Status: COMPLETED | OUTPATIENT
Start: 2023-01-01 | End: 2023-01-01

## 2023-01-01 RX ADMIN — SODIUM CHLORIDE 1000 ML: 9 INJECTION, SOLUTION INTRAVENOUS at 01:36

## 2023-01-01 RX ADMIN — LORAZEPAM 2 MG: 2 INJECTION INTRAMUSCULAR; INTRAVENOUS at 05:16

## 2023-01-01 RX ADMIN — ASPIRIN 81 MG CHEWABLE TABLET 324 MG: 81 TABLET CHEWABLE at 02:54

## 2023-01-01 RX ADMIN — HYDROMORPHONE HYDROCHLORIDE 0.5 MG: 1 INJECTION, SOLUTION INTRAMUSCULAR; INTRAVENOUS; SUBCUTANEOUS at 04:51

## 2023-01-01 RX ADMIN — VANCOMYCIN HYDROCHLORIDE 1250 MG: 10 INJECTION, POWDER, LYOPHILIZED, FOR SOLUTION INTRAVENOUS at 03:37

## 2023-01-01 RX ADMIN — MORPHINE SULFATE 4 MG: 4 INJECTION, SOLUTION INTRAMUSCULAR; INTRAVENOUS at 03:24

## 2023-01-01 RX ADMIN — PIPERACILLIN AND TAZOBACTAM 4500 MG: 4; .5 INJECTION, POWDER, LYOPHILIZED, FOR SOLUTION INTRAVENOUS at 03:24

## 2023-01-01 RX ADMIN — DIPHENHYDRAMINE HYDROCHLORIDE 25 MG: 50 INJECTION, SOLUTION INTRAMUSCULAR; INTRAVENOUS at 03:08

## 2023-01-01 RX ADMIN — SODIUM CHLORIDE 1000 ML: 900 INJECTION, SOLUTION INTRAVENOUS at 02:33

## 2023-01-01 RX ADMIN — HEPARIN SODIUM 12 UNITS/KG/HR: 10000 INJECTION, SOLUTION INTRAVENOUS at 02:25

## 2023-01-01 RX ADMIN — HEPARIN SODIUM 3080 UNITS: 1000 INJECTION INTRAVENOUS; SUBCUTANEOUS at 02:24

## 2023-01-01 RX ADMIN — MORPHINE SULFATE 2 MG: 2 INJECTION, SOLUTION INTRAMUSCULAR; INTRAVENOUS at 01:37

## 2023-01-01 RX ADMIN — SODIUM CHLORIDE: 9 INJECTION, SOLUTION INTRAVENOUS at 03:09

## 2023-01-01 ASSESSMENT — PAIN - FUNCTIONAL ASSESSMENT
PAIN_FUNCTIONAL_ASSESSMENT: ADULT NONVERBAL PAIN SCALE (NPVS)
PAIN_FUNCTIONAL_ASSESSMENT: ADULT NONVERBAL PAIN SCALE (NPVS)

## 2023-05-17 NOTE — PROGRESS NOTES
CC: Leadless Pacemaker check    HPI  Zoya Dunn, 83 year old female presents for a routine Nanostim STJ leadless Pacemaker evaluation. She has a history of Atrial Fibrillation with RVR treated with Propafanone and a history of sinus pauses.  Patient denies chest pain, SOB, palpitations, edema, MADRID, orthopnea, syncope.  No bleeding issues. She has dizziness related to her neck issue, increased frequency. She wears a neck brace at home.     Past Medical History:   Diagnosis Date   • Anemia    • Anxiety 2018   • Anxiety state, unspecified    • ARF (acute renal failure) (CMD) 07/21/2014   • Atrial fibrillation (CMD)     paroxismal- on Eliquis   • Back pain 2017    uses pain medication prn for this   • Breast cancer (CMD) 05/2012    left breast surgery and radiation   • Cataracts, bilateral    • Chronic pain     back pain from compression fractures   • Chronic renal failure     Dr. Juares   • Congestive cardiac failure (CMD)    • Coronary artery disease 1997    PTCA done   • Depression    • Dermatitis     very sensitive skin   • Diverticulosis    • Essential (primary) hypertension    • Essential hypertension, benign 09/16/2013   • Fracture     Fx of foot; compression fx of back   • Fracture 2017    right pelvic compression   • Heart attack (CMD) [id number]   • Itching     facial- around eyes   • Limited mobility     uses walker at night   • MI (myocardial infarction) (CMD) 01/01/1997    mild heart attack per pt notes   • Osteoarthrosis, unspecified whether generalized or localized, lower leg     both knees   • Osteoporosis, unspecified 10/10/2008    Was on Actonel but had side effects of bone brittleness   • Other and unspecified hyperlipidemia 10/10/2008   • Pacemaker 2014     St Tyrone for bradycardia- Dr. Nolen   • Personal history of radiation therapy 2012   • PONV (postoperative nausea and vomiting)    • Sinus pause    • Toe injury, right, initial encounter 09/30/2019    right 4th toe bruised and nehemias taped for  [facility]ist Group  Progress Note    Patient: Mable Nieves Age: 74 y.o. : 1955 MR#: [id number] SSN: [id number]  Date/Time: 3/14/2022     C/C:AMS        Subjective:   HPI : Patient with history of multiple sclerosis multiple CVA, , chronic smoking hypertension hyperlipidemia being admitted with altered mental status, also patient had a fever and lactic acidosis suspecting sepsis patient's blood culture now positive for gram-negative bacilli repeat cultures pending, ID aware. Review of Systems:     -According to patient she is having pains, mainly crampy pains, all over( ?  From MS, ? From constipation, )   -Denies any chest pain shortness of breath no nausea vomiting, constipation improved  -No fever chills tolerating current medications  -Relief of constipation       Assessment/Plan:      1. Acute metabolic encephalopathy  2 dehydration  3 gram-negative sepsis  4 hypertension  5 old / chronic small multiple CVAs  6 chronic tobacco abuse   7 history of falls history of possible syncope this admission- SA and statin - follow neuro recs   8 Severe constipation   9 Abnormality in Uterus - on CT abd .   10 Hypokalemia   11 leukocytosis-improving      Plan    -Patient has some drop in H&H which was noticed, this could be secondary to her hydration effect we will continue to monitor H&H-as expected no significant drop in H&H    -Good effect with p.o. laxatives patient has frequent normal bowel movements, with that she is feeling better    -CT abnormality suggestive of intrauterine cystic mass or arterial, patient does have some brownish discharge off and on, patient may need to be referred outpatient to GYN for further management of this issue which requires intravaginal and abdominal uterine ultrasound.   Today I had opportunity to discuss this with the patient and patient's daughter in room together both agreed that they will follow-up outpatient gynecologist, injury sustained   • Vaginal prolapse without uterine prolapse 02/27/2018    has pessary for stage III anterior dominant prolapse   • Wears glasses     for reading     Past Surgical History:   Procedure Laterality Date   • Back surgery  09/2015    kephoplasty   • Breast biopsy Left 1994    Excisional Biopsy- benign   • Breast lumpectomy Left 05/2012   • Cardiac catherization  02/2015   • Cataract extraction w/  intraocular lens implant Bilateral 03/05/2018 4/2018    cataract extraction with IOL Implantation   • Colonoscopy  2005;2011    Colonoscopy/polypectomy   • Dexa bone density axial skeleton  06/01/2005    Severe bone mineral density loss consistent with osteoporosis   • Endometrial bx w colposcopy  05/11/1991    Minute fragment of squamous metaplastic endocervical mucosa.   • Eye surgery Bilateral 2018    cataract surgery   • Hemorrhoid surgery  remote past   • Joint replacement     • Leadless pacemaker  06/07/2014    St Tyrone Nolen   • Left heart cath  06/26/2018    Patent stents   • Mammo stereotactic biopsy left Left 12/2014    Benign   • Ptca  02/2005    stent;LAD;RCA   • Ptca with stent  06/01/2018    patent lad stent  RCA HONEY x 2 placed   • Ptca with stent  05/28/2018    LAD HONEY    • Removal gallbladder  1987   • Skin biopsy     • Total knee replacement Right 2010    TKR   • Total knee replacement Left 01/2012    TKR   • Tubal ligation     • Vaginal delivery      x2   • Vascular surgery       Family History   Problem Relation Age of Onset   • Cancer, Breast Mother    • Cancer Mother         breast   • Depression Mother    • Cancer Father         lung   • Cancer, Breast Sister    • Cancer Sister         breast   • Osteoarthritis Sister    • Osteoarthritis Sister    • Depression Sister    • Heart disease Sister    • Depression Sister    • Heart disease Sister    • Cancer Sister    • Heart disease Brother    • Early death Brother    • Heart disease Brother    • Early death Brother    • Heart disease  patient's daughter suggested patient has a PCP and she will take their help to go to their preferred GYN which is agreeable to me at this point. Importance of not missing that discussed with patient and her daughter. Both verbalize importance of follow-up      -Acute metabolic encephalopathy secondary to sepsis gram-negative, currently on antibiotics per ID, repeat cultures pending follow CT abdomen pelvis rule out intra-abdominal source for infection-no clear source of gram-negative bacteremia is identified at this stage however, E. coli and Proteus which were found are very sensitive to multiple antibiotics-follow ID recommendations     -MRI does not show any new CVA     -Strong recommendation to stop tobacco abuse she understands and she says she will try     -Blood pressure fairly controlled continue current treatment     -ID and neurology consultation appreciated    Disposition: Home with home health once antibiotic is decided     Objective:         General:  Alert, cooperative, no acute distress   HEENT: No facial asymmetry, CONCHITA Henrik, External ears - WNL    Cardiovascular: S1S2 - regular , No Murmur   Pulmonary: Equal expansion , No Use of accessory muscles , No Rales No Rhonchi    GI:  +BS in all four quadrants, soft, non-tender  Extremities:  No edema; 2+ dorsalis pedis pulses bilaterally  Neuro: Alert and oriented X 2.          DVT Prophylaxis:  [x]Lovenox  []Hep SQ  []SCDs  []Coumadin   []On Heparin gtt     [] Eliquis [] Xarelto        Vitals:         VS:   Visit Vitals  BP (!) 172/72 (BP 1 Location: Right upper arm) Comment: Aris RN aware   Pulse 80   Temp 97.6 °F (36.4 °C)   Resp 18   Ht 5' 5\" (1.651 m)   Wt 51.3 kg (113 lb)   SpO2 100%   BMI 18.80 kg/m²      Tmax/24hrs: Temp (24hrs), Av.5 °F (36.4 °C), Min:97.3 °F (36.3 °C), Max:97.7 °F (36.5 °C)  IOBRIEF    Intake/Output Summary (Last 24 hours) at 3/14/2022 1500  Last data filed at 3/14/2022 0518  Gross per 24 hour   Intake 360 ml   Output 400 Brother    • Heart disease Brother    • Cancer, Breast Maternal Aunt    • Cancer Maternal Aunt 60        Breast   • Cancer, Breast Maternal Aunt    • Cancer Maternal Aunt 60        breast     Social History     Socioeconomic History   • Marital status: /Civil Union     Spouse name: Jeffery Dunn   • Number of children: 2   • Years of education: Not on file   • Highest education level: Not on file   Occupational History   • Occupation: Retired     Employer: other - retired     Comment: Factory, Retired    Tobacco Use   • Smoking status: Former     Current packs/day: 0.00     Average packs/day: 3.0 packs/day for 15.0 years (45.0 pk-yrs)     Types: Cigarettes     Start date: 1960     Quit date: 1975     Years since quittin.4   • Smokeless tobacco: Never   Vaping Use   • Vaping status: Not on file   Substance and Sexual Activity   • Alcohol use: Yes     Alcohol/week: 1.0 standard drink of alcohol     Types: 1 Glasses of wine per week     Comment: 2-3 week   • Drug use: No   • Sexual activity: Never     Partners: Female   Other Topics Concern   •  Service No   • Blood Transfusions No   • Caffeine Concern No     Comment: 2 cups coffee/day   • Occupational Exposure No     Comment: No known exposure to benzene, lead, radiation, asbestos; 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   • Hobby Hazards Not Asked   • Sleep Concern Not Asked   • Stress Concern Not Asked   • Weight Concern Not Asked   • Special Diet Not Asked   • Back Care Not Asked   • Exercise Not Asked   • Bike Helmet Not Asked   • Seat Belt Yes   • Self-Exams No   Social History Narrative   • Not on file     Social Determinants of Health     Financial Resource Strain: Not on file   Food Insecurity: Not on file   Transportation Needs: Not on file   Physical Activity: Not on file   Stress: Not on file   Social Connections: Not on file   Intimate Partner Violence: Not At Risk (2021)    Intimate Partner Violence  ml   Net -40 ml         Medications:   Current Facility-Administered Medications   Medication Dose Route Frequency    glucose chewable tablet 16 g  4 Tablet Oral PRN    glucagon (GLUCAGEN) injection 1 mg  1 mg IntraMUSCular PRN    dextrose 10% infusion 0-250 mL  0-250 mL IntraVENous PRN    oxyCODONE-acetaminophen (PERCOCET) 5-325 mg per tablet 1 Tablet  1 Tablet Oral Q6H PRN    levoFLOXacin (LEVAQUIN) 750 mg in D5W IVPB  750 mg IntraVENous Q24H    cefepime (MAXIPIME) 2 g in 0.9% sodium chloride (MBP/ADV) 100 mL MBP  2 g IntraVENous Q12H    sodium chloride (NS) flush 5-10 mL  5-10 mL IntraVENous PRN    aspirin tablet 325 mg  325 mg Oral QHS    clonazePAM (KlonoPIN) tablet 0.5 mg  0.5 mg Oral TID PRN    cyclobenzaprine (FLEXERIL) tablet 10 mg  10 mg Oral Q8H PRN    lisinopriL (PRINIVIL, ZESTRIL) tablet 40 mg  40 mg Oral DAILY    therapeutic multivitamin (THERAGRAN) tablet 1 Tablet  1 Tablet Oral DAILY    atorvastatin (LIPITOR) tablet 80 mg  80 mg Oral QHS    sodium chloride (NS) flush 5-40 mL  5-40 mL IntraVENous Q8H    sodium chloride (NS) flush 5-40 mL  5-40 mL IntraVENous PRN    acetaminophen (TYLENOL) tablet 650 mg  650 mg Oral Q6H PRN    Or    acetaminophen (TYLENOL) suppository 650 mg  650 mg Rectal Q6H PRN    polyethylene glycol (MIRALAX) packet 17 g  17 g Oral DAILY PRN    promethazine (PHENERGAN) tablet 12.5 mg  12.5 mg Oral Q6H PRN    Or    ondansetron (ZOFRAN) injection 4 mg  4 mg IntraVENous Q6H PRN    [Held by provider] enoxaparin (LOVENOX) injection 40 mg  40 mg SubCUTAneous DAILY    metoprolol succinate (TOPROL-XL) tablet 100 mg  100 mg Oral DAILY       Labs:    Recent Labs     03/14/22  0432 03/13/22  0128 03/12/22  0404   WBC 12.6 18.5* 22.7*   HGB 9.1* 8.2* 8.4*   HCT 29.3* 26.7* 27.4*    203 222     Recent Labs     03/14/22  0432 03/13/22  0128 03/12/22  0404    144 142   K 5.4 3.3* 3.2*   * 114* 113*   CO2 27 24 23   GLU 84 55* 72*   BUN 19* 21* 19*   CREA    • Social Determinants: Intimate Partner Violence Past Fear: No    • Social Determinants: Intimate Partner Violence Current Fear: No     Current Outpatient Medications   Medication Sig   • Eliquis 2.5 MG Tab TAKE 1 TABLET BY MOUTH EVERY 12 HOURS   • ALPRAZolam (XANAX) 0.25 MG tablet TAKE 1 TABLET BY MOUTH 3 TIMES DAILY AS NEEDED FOR SLEEP OR ANXIETY.   • propafenone (RYTHMOL) 150 MG tablet TAKE 1 TABLET BY MOUTH TWICE A DAY   • rosuvastatin (CRESTOR) 5 MG tablet TAKE 1/2 TABLET BY MOUTH EVERY OTHER DAY, ALTERNATING BY TAKING 1 TABLET   • HYDROcodone-acetaminophen (NORCO) 5-325 MG per tablet Take 0.5 tablets by mouth at bedtime as needed for Pain.   • metoPROLOL succinate (TOPROL-XL) 25 MG 24 hr tablet TAKE 1/2 TABLET BY MOUTH DAILY   • Brilinta 60 MG tablet TAKE 1 TABLET BY MOUTH TWICE A DAY   • lisinopril (ZESTRIL) 5 MG tablet TAKE 1 TABLET BY MOUTH EVERY DAY AT NIGHT   • amoxicillin (AMOXIL) 500 MG capsule    • nitroGLYcerin (NITROSTAT) 0.4 MG sublingual tablet Place 1 tablet under the tongue every 5 minutes as needed for Chest pain.   • Ferrous Sulfate 28 MG Tab Take 28 mg by mouth 2 days a week. (Patient taking differently: Take 28 mg by mouth 1 day a week.)   • Multiple Vitamins-Minerals (PRESERVISION AREDS 2) Cap Take 1 capsule by mouth 2 times daily.    • calcium-vitamin D (OSCAL-500) 500-200 MG-UNIT per tablet Take 1 tablet by mouth 2 times daily (with meals).    • acetaminophen (TYLENOL) 500 MG tablet Take 1,000 mg by mouth at bedtime as needed for Pain.    • Propylene Glycol (SYSTANE BALANCE OP) Place 1-2 drops into both eyes 2 times daily.    • Cholecalciferol (VITAMIN D-3) 1000 UNITS Cap Take 2 capsules by mouth daily (at noon). Dose: 2 capsules (= 2000 mg)   • folic acid (FOLATE) 400 MCG tablet Take 400 mcg by mouth daily.   • Cyanocobalamin (VITAMIN B 12 PO) Take 250 mcg by mouth every other day. Takes at noon     No current facility-administered medications for this visit.     Facility-Administered  0. 87 0.77 1.01   CA 8.9 8.7 8.8         Time spent on direct patient care >30 mints     Complexity : High complex - due to multiple medical issues outlined above. CODE Status : full code    Case discussed with:  [x]Patient  [x] Family/daughter[]Nursing  []Case Management         Disclaimer: Sections of this note are dictated utilizing voice recognition software, which may have resulted in some phonetic based errors in grammar and contents. Even though attempts were made to correct all the mistakes, some may have been missed, and remained in the body of the document. If questions arise, please contact our department.     Signed By: Diana Killian MD     March 14, 2022 Medications Ordered in Other Visits   Medication   • heparin 2 unit/mL in NaCL 0.9% solution     ALLERGIES:   Allergen Reactions   • Actonel DIZZINESS     Dizzy for the first 3 hours. Eyes and teeth hurt, soreness all over for 3 weeks. Also itching all over face and neck for several months   • Fosamax MYALGIA   • Miacalcin DIARRHEA   • Niaspan [Niacin (Antihyperlipidemic)] MYALGIA   • Statins MYALGIA     Pain all over   • Zetia [Ezetimibe] MYALGIA   • Tricor MYALGIA       ROS    A complete review of systems was performed, and aside from what is mentioned in HPI, was negative.       Physical Exam    Visit Vitals  /62   Pulse 64   Ht 5' (1.524 m)   Wt 61.2 kg (135 lb)   BMI 26.37 kg/m²      GENERAL: appears stated age, well developed and well nourished, in no distress and normal affect  SKIN: normal color, normal texture, normal turgor, no skin rashes, no atypical appearing skin lesions and no bruises  EYES: pupils are equal and reactive to light extraocular movements are full sclerae and conjunctivae are normal lids and lashes are normal  LUNGS: no accessory muscle use  HEART: normal PMI, normal rate and rhythm  NEUROLOGIC: motor strength normal, gait and station normal, coordination normal and no tremor noted  EXTREMITIES: no clubbing, no cyanosis, no edema and normal muscle tone and development bilaterally   PSYCH: Mood and affect normal, alert and oriented to person, place, situation.    Ejection Fraction   Date Value Ref Range Status   05/27/2021 59 % Corrected     Sodium (mmol/L)   Date Value   02/09/2023 135     Potassium (mmol/L)   Date Value   02/09/2023 4.2     Chloride (mmol/L)   Date Value   02/09/2023 102     Glucose (mg/dL)   Date Value   02/09/2023 92     Calcium (mg/dL)   Date Value   02/09/2023 9.2     Carbon Dioxide (mmol/L)   Date Value   02/09/2023 28     BUN (mg/dL)   Date Value   02/09/2023 23 (H)     Creatinine (mg/dL)   Date Value   02/09/2023 1.08 (H)     WBC (K/mcL)   Date Value    02/09/2023 5.8     RBC (mil/mcL)   Date Value   02/09/2023 3.53 (L)     HCT (%)   Date Value   02/09/2023 34.7 (L)     HGB (g/dL)   Date Value   02/09/2023 11.6 (L)     PLT (K/mcL)   Date Value   02/09/2023 182     TSH (mcUnits/mL)   Date Value   06/13/2020 2.190     GOT/AST (Units/L)   Date Value   02/09/2023 17     GPT/ALT (Units/L)   Date Value   02/09/2023 21     No results found for: GGTP  Alkaline Phosphatase (Units/L)   Date Value   02/09/2023 74     Bilirubin, Total (mg/dL)   Date Value   02/09/2023 0.8         Implantable device personally programmed. Report reviewed and all diagnostics and measurements are within normal ranges for this patient. There were no events recorded by the device.  percentage 4%. R waves small, but stable. Media not available.       ASSESSMENT/PLAN      1. Paroxysmal atrial fibrillation   [id number] score of 5, on Eliquis low dose   High risk medication management: propafenone    2. Sinus pauses   STJ Leadless PPM implanted in 2014   Battery advisory, not dependent 4% pacing   Symptomatic with testing     3. CAD   PCI, NSTEMI hx   bilinta   Dr. Howe    4. HTN   Metoprolol, lisinopril    5. Dizziness   Attributed to her neck arthritis      Gina feels well. No signs of battery failure of device. Her  percentage remains stable around 4%. R waves smaller, stable. Routine 6 month OFV for STJ Leadless pacemaker. Continue propafenone, EKG annually.

## 2023-06-16 PROBLEM — K63.1 PERFORATED BOWEL (HCC): Status: ACTIVE | Noted: 2023-01-01

## 2023-06-16 PROBLEM — I21.4 NSTEMI (NON-ST ELEVATED MYOCARDIAL INFARCTION) (HCC): Status: ACTIVE | Noted: 2023-01-01

## 2023-06-16 NOTE — H&P
HPI: Dave Calderon is a 69 y.o. female presenting with chief complain of free air. Per daughter has been having abdominal pain. Presented to ED with severe abdominal pain. CT showed free air and fluid. No history gastrointestinal issues or colorectal cancer in family. Found to have NSTEMI in ER started on heparin with bolus and ASA, halted once CT results returned. Pt currently somnolent and oriented only x 1. Discussed with daughter. Past Medical History:   Diagnosis Date    Autoimmune disease ([address]     MS    Gross hematuria     Hypertension     Kidney stones     MS (congenital mitral stenosis)     Stroke (cerebrum) [facility])        Past Surgical History:   Procedure Laterality Date    UROLOGICAL SURGERY  12/2016    stent placement       History reviewed. No pertinent family history.     Social History     Socioeconomic History    Marital status:      Spouse name: None    Number of children: None    Years of education: None    Highest education level: None   Tobacco Use    Smoking status: Former    Smokeless tobacco: Never   Substance and Sexual Activity    Alcohol use: Yes    Drug use: No       Current Outpatient Medications   Medication Instructions    aspirin 325 mg, Oral    atorvastatin (LIPITOR) 80 mg, Oral    clonazePAM (KLONOPIN) 0.5 mg, Oral, 3 TIMES DAILY PRN    cyclobenzaprine (FLEXERIL) 10 mg, Oral, EVERY 8 HOURS PRN    lisinopril (PRINIVIL;ZESTRIL) 40 mg, Oral, DAILY    metoprolol succinate (TOPROL XL) 100 mg, Oral, DAILY    polyethylene glycol (GLYCOLAX) 17 g, Oral, DAILY        Allergies   Allergen Reactions    Codeine Other (See Comments), Rash and Shortness Of Breath    Interferon Beta-1a Other (See Comments)     ELEVATED LIVER FUNCTION TESTS    Penicillins Hives       ROS: negative    Vitals:    06/16/23 0630   BP: (!) 153/70   Pulse: 100   Resp: 20   Temp: 98.2 °F (36.8 °C)   SpO2: 98%       Physical Exam  Abd soft, distended, not apparently tender, pt somnolent    Lab Results   Component

## 2023-06-16 NOTE — ED TRIAGE NOTES
Patient comes via EMS c/o abd. Pain onset 3 weeks ago with increased pain. HX: MS, cysts in abdomen.

## 2023-06-16 NOTE — BRIEF OP NOTE
Brief Postoperative Note      Patient: Luis Marr  YOB: 1955  MRN: [id number]    Date of Procedure: 6/16/2023    Pre-Op Diagnosis Codes:     * Free intraperitoneal air [K66.8]    Post-Op Diagnosis:  Purulent peritonitis       Procedure: exploratory laparotomy    Surgeon(s):  Homero Phipps MD    Assistant:  Surgical Assistant: Gautam Dillard    Anesthesia: General    Estimated Blood Loss (mL): Minimal    Complications:  Other: Cardiac arrest    Specimens:   * No specimens in log *    Implants:  * No implants in log *      Drains: * No LDAs found *    Findings: purulent peritonitis, no further exploration due to cardiac arrest    [id number]      Electronically signed by Homero Phipps MD on 6/16/2023 at 8:20 AM

## 2023-06-16 NOTE — ED NOTES
Dr. Puneet Jara notified of critical labs, troponin and lactic     Selmer Bamberger, RN  06/16/23 0826

## 2023-06-16 NOTE — PROGRESS NOTES
Patient unfortunately had a code blue and passed away. Hospitalist service to sign off, available if we can be of any help at all.

## 2023-06-16 NOTE — PERIOP NOTE
07:56 Code started, compressions initiated, pt has absent pulse, in asystole, pt intubated. 07:57 epi given  07:58 rhythm analyzed by defibrillator: no shockable rhythm: asystole, CPR resumed. 08:00 epi given  08:04 pulse check; no pulse CPR resumed  08:06 epi given  08:08 rhythm analyzed by defibrillator: no shockable rhythm: asystole, CPR resumed. 08:09 Bi Cab Given  08:12 rhythm analyzed by defibrillator: no shockable rythum: asystole, CPR resumed. 08:14 epi given  08:15 rhythm analyzed by defibrillator: no shockable rythum: asystole, CPR resumed.    08:16 TOD called, code ended  08:30 Family notified  09:00 pt transferred to room 362

## 2023-06-16 NOTE — ED NOTES
Patient transferred to OR for surgery   OR nurses came to get patient.      Trish Hernandez RN  06/16/23 9316

## 2023-06-16 NOTE — PERIOP NOTE
Prior to going back to OR, patient's critical status was discussed with patient's daughter (medical decision maker), explained to her the risk of intra-op MI (given NSTEMI on admission), stroke, and death. She also had discussion with surgeon regarding these risks and agreed to proceed with surgery given patient's likely perforated colon and developing sepsis. Intra-op, at time of opening abdomen, LOU Phillips noted ekg changes (st elevation, large t waves, then brdaycardia), anesthesiologist Dr. Leslie Ignacio called to OR. On Dr. Adolfo Farrell immediate arrival, pt noted to be increasingly bradycardiac, given 1mg atropine, no pulse noted, chest compressions started and code blue called. ACLS initiated, 1mg epinephrine total given total of 4 times and 50mEq bicarb, fluids wide open. No pulse at pulse checks, no shockable rhythm. Adequate ventilation and oxygenation throughout code. About 10min into code, pt noted to have blood coming from ETT, suctioned, continued adequate ventilation/oxygenation. No pulse noted at any pulse checks, all but last PEA. At final pulse check, noted to be asystole. Code blue called at 0816.

## 2023-06-16 NOTE — OP NOTE
[address] [place] [address]   OPERATIVE REPORT    Name:  Cruz Molina  MR#:   [id number]  :  1955  ACCOUNT #:  [de-identified]  DATE OF SERVICE:  2023    PREOPERATIVE DIAGNOSIS:  Free air. POSTOPERATIVE DIAGNOSES:  Purulent peritonitis, cardiac arrest.    PROCEDURE PERFORMED:  Exploratory laparotomy. SURGEON:  Odessa Segovia MD    ASSISTANT:  Romeo Collier. ANESTHESIA:  General.    COMPLICATIONS:  Cardiac arrest.    SPECIMENS REMOVED:  None. IMPLANTS:  None. ESTIMATED BLOOD LOSS:  Minimal.    FINDINGS:  Purulent peritonitis. No further exploration due to cardiac arrest.    INDICATION:  The patient is a 68-year-old woman who presented to the emergency department with abdominal pain. CT imaging showed free air and free fluid throughout the abdomen into the retroperitoneum. She was brought to the operating room urgently for exploratory laparotomy. The patient was noted to have non-ST-elevation MI as well and was initially started on heparin drip and aspirin. This was held for approximately 3 hours prior to surgery to allow for surgical exploration. I explained the risks to her daughter as she was not alert and oriented x3. I told her that she was in grave medical condition. The risks of the procedure included bleeding, infection, injury to surrounding structures, need for a colostomy. I told her that there was a significant chance of mortality perioperatively given both her cardiac and bowel issues. She understood and wished for us to proceed. PROCEDURE:  The patient was properly identified in the holding area and brought to the operating room. She was laid supine on the operating room table. General anesthesia was administered. Drummond catheter was placed. Abdomen was prepped and draped in the usual sterile fashion. Midline incision was made with a 10-blade. This was carried down through subcutaneous tissues. The fascia was incised, and the abdominal cavity entered.

## 2023-06-16 NOTE — ED NOTES
Socks applied  Xray complete  MD at bedside explaining patient will go to surgery at 0600, family at bedside and verbalize understanding     Cristian Cortez RN  06/16/23 [id number]

## 2023-06-16 NOTE — ED NOTES
Patient back from CT  Given iced water  Ekg complete  Repeat blood work complete     Lucia Tompkins, SAM  06/16/23 [id number]

## 2023-06-16 NOTE — PROGRESS NOTES
Chart reviewed and pt discussed with Dr. Lul Mistry. Several week history of abdominal pain. Lactic acidosis. NSTEMI. ARF. CT showing free air and fluid. Likely colon perforation. Pt has received adult dose ASA and heparin bolus. Will delay surgery until 7:30 for bleeding risk, continue resuscitation. Ex lap ASAP.

## 2023-06-16 NOTE — ED PROVIDER NOTES
critical care time involved in lab review, consultations with specialist, family decision-making, and documentation. This time does not include time spent on separately billable procedures. During this entire length of time I was immediately available to the patient. Critical Care: The reason for providing this level of medical care for this critically ill patient was due a critical illness that impaired one or more vital organ systems such that there was a high probability of imminent or life threatening deterioration in the patients condition. This care involved high complexity decision making to assess, manipulate, and support vital system functions, to treat this degreee vital organ system failure and to prevent further life threatening deterioration of the patients condition. Ahmet Zafar MD    Diagnosis and Disposition   Core Measures:  For Hospitalized Patients:    Hospitalization Decision Time:  The decision to hospitalize the patient was made by Ahmte Zafar MD @ [address] on 6/16/2023    Patient is being admitted to the hospital by Farhad Carrasco . The results of their tests and reasons for their admission have been discussed with them and/or available family. They convey agreement and understanding for the need to be admitted and for their admission diagnosis. CLINICAL IMPRESSION:  1. Abdominal pain, unspecified abdominal location    2. JERILYN (acute kidney injury) ([facility] [address]    3. NSTEMI (non-ST elevated myocardial infarction) ([facility] [address]    4. Bowel perforation [facility])        PLAN:  Operating room    Dragon Disclaimer     Please note that this dictation was completed with [employer], the computer voice recognition software. Quite often unanticipated grammatical, syntax, homophones, and other interpretive errors are inadvertently transcribed by the computer software. Please disregard these errors. Please excuse any errors that have escaped final proofreading.       Edson Tan MD am the primary

## 2023-06-16 NOTE — CONSULTS
with bowel  perforation. Air seems to localize to the left suggesting perforation involving  the descending colon. There is some air tracking into the deep soft tissues of the upper leg,  presumably extension of intra-abdominal air. Deep space infection is not fully  excluded. There is a large burden of retained fecal material within the colon. Critical result called to Dr. Nicolas Nieves, June 16, 2023 at 2:55 AM. Confirmed. CXR - pending    CANDELARIA GUALLPA,   6/16/2023  5:07 AM    Disclaimer: Sections of this note are dictated using utilizing voice recognition software, which may have resulted in some phonetic based errors in grammar and contents. Even though attempts were made to correct all the mistakes, some may have been missed, and remained in the body of the document. If questions arise, please contact our department.

## 2023-06-16 NOTE — PROGRESS NOTES
Reason for Renal Dosing:  Per Renal Dosing Policy/Extended Infusion B-Lactam Antibiotics Protocol. Patient clinical status and labs ordered/reviewed. Pt Weight Weight - Scale: 113 lb (51.3 kg)   Serum Creatinine No results found for: MIGUE, CREA, CREAPOC    Creatinine Clearance Estimated Creatinine Clearance: 18 mL/min (A) (based on SCr of 2.34 mg/dL (H)). BUN Lab Results   Component Value Date/Time    BUN 41 06/16/2023 12:40 AM       WBC Lab Results   Component Value Date/Time    WBC 5.8 06/16/2023 12:40 AM      Temperature Temp: 98.2 °F (36.8 °C)   HR Pulse: 100   BP BP: (!) 153/70     Drug type: Antibiotic indicated for Intra-Abdominal Infection      Drug/dose: Zosyn 4.5 g IV q8h was adjusted to: 3.375 g IV q12h infused over 4 hrs (for eCrCl = 18 mL/min) - per Extended Infusion B-Lactam Antibiotics Protocol. Continue to monitor.     Signed Ruth Ann Santos [facility]  Date 6/16/2023  Time 7:29 AM

## 2023-06-17 LAB
ABO + RH BLD: NORMAL
BLOOD GROUP ANTIBODIES SERPL: NORMAL
SPECIMEN EXP DATE BLD: NORMAL

## 2023-06-21 NOTE — DISCHARGE SUMMARY
Colon and Rectal Surgery Discharge Summary     Patient: Elvia Soriano MRN: [id number]  SSN: [id number]    YOB: 1955  Age: 69 y.o. Sex: female       Admit Date: 2023    Discharge Date: 2023    Admission Diagnoses: Bowel perforation (HCC) [K63.1]  NSTEMI (non-ST elevated myocardial infarction) ([facility] [address] [I21.4]  JERILYN (acute kidney injury) ([facility] [address] [N17.9]  Perforated bowel ([facility] [address] [K63.1]  Abdominal pain, unspecified abdominal location [R10.9]    Discharge Diagnoses: Same    Procedure:  Exploratory laparotomy    Discharge Condition:     Hospital Course: Pt presented to ED with severe abdominal pain. She was also found to be in ARF and had evidence of NSTEMI. CT imaging showed moderate free air and free fluid, pt brought to OR urgently for exploratory laparotomy. Soon after beginning the surgery pt became asystolic. Code began for 20 minutes however pt did not regain cardiac activity. Pronounced dead in the OR. Consults: Cardiology and Hospitalist    Significant Diagnostic Studies: CT showing free air and fluid    Disposition: None    Discharge Medications:   Discharge Medication List as of 2023  3:03 PM        CONTINUE these medications which have NOT CHANGED    Details   aspirin 325 MG tablet Take 1 tablet by mouthHistorical Med      atorvastatin (LIPITOR) 80 MG tablet Take 1 tablet by mouthHistorical Med      clonazePAM (KLONOPIN) 0.5 MG tablet Take 1 tablet by mouth 3 times daily as needed. Historical Med      cyclobenzaprine (FLEXERIL) 10 MG tablet Take 1 tablet by mouth every 8 hours as neededHistorical Med      lisinopril (PRINIVIL;ZESTRIL) 40 MG tablet Take 1 tablet by mouth dailyHistorical Med      metoprolol succinate (TOPROL XL) 100 MG extended release tablet Take 1 tablet by mouth dailyHistorical Med      polyethylene glycol (GLYCOLAX) 17 GM/SCOOP powder Take 17 g by mouth dailyHistorical Med             Activity: N/A  Diet:  N/A  Wound Care: N/A    No follow-ups on

## 2023-06-22 LAB
BACTERIA SPEC CULT: NORMAL
BACTERIA SPEC CULT: NORMAL
SERVICE CMNT-IMP: NORMAL
SERVICE CMNT-IMP: NORMAL

## 2025-06-30 NOTE — PROGRESS NOTES
Comprehensive Nutrition Assessment    Type and Reason for Visit: Initial (BMI)    Nutrition Recommendations/Plan:   - Add supplement: Ensure Enlive BID  - Continue all other nutrition interventions. Encourage/ monitor po intake of meals and supplements. 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 Was NPO upon admission due to plan for CT of chest and head and x-ray of chest this morning. Noted diet started recently. Pt underweight; although, noted wt stable for past 5 years. Per RN note this morning, patient's family reported pt having increasing confusion and poor appetite x past week PTA. Malnutrition Assessment:  Malnutrition Status: At risk for malnutrition (specify) (poor po intake PTA;  underweight)      Nutrition History and Allergies: Past medical hx: HTN, multiple small strokes, kidney stones. Per H&P, pt usually eats well per family report, occasional alcohol use; Had developed anorexia, not eating, family noticed her being confused. Wt trends PTA per chart hx:  110 lb on 1/23/2017,  113 lb on 2/13/2017,   109 lb on 2/20/2017,   109 lb on 3/11/2022. No known food allergies. Estimated Daily Nutrient Needs:  Energy (kcal): 2760-4240; Weight Used for Energy Requirements: Admission (49.4 kg)  Protein (g): 49-69; Weight Used for Protein Requirements: Admission (x1-1.4)  Fluid (ml/day): 8066-4568; Method Used for Fluid Requirements: 1 ml/kcal      Nutrition Related Findings:  BM unknown. No edema. MVI ordered.        Wounds:    None       Current Nutrition Therapies:  ADULT DIET Regular    Anthropometric Measures:  · Height:  5' 5\" (165.1 cm)  · Current Body Wt:  49.4 kg (108 lb 14.5 oz)   · Admission Body Wt:  108 lb 14.5 oz    · Usual Body Wt:   (109- 113 lb per chart hx)     · Ideal Body Wt:  125 lbs:  87.1 %   · Adjusted Body Weight:   ; Weight Adjustment for: No adjustment    · BMI Category: Underweight (BMI less than 22) age over 72       Nutrition Diagnosis:   · Underweight related to inadequate protein-energy intake as evidenced by BMI    · Inadequate oral intake related to early satiety,cognitive or neurological impairment as evidenced by poor intake prior to admission      Nutrition Interventions:   Food and/or Nutrient Delivery: Continue current diet,Start oral nutrition supplement,Vitamin supplement  Nutrition Education and Counseling: No recommendations at this time,Education not indicated  Coordination of Nutrition Care: Continue to monitor while inpatient    Goals:  PO nutrition intake will meet >75% of patient's estimated nutrition needs within the next follow up date       Nutrition Monitoring and Evaluation:   Behavioral-Environmental Outcomes: None identified  Food/Nutrient Intake Outcomes: Food and nutrient intake,Supplement intake,Vitamin/mineral intake  Physical Signs/Symptoms Outcomes: Biochemical data,Meal time behavior    Discharge Planning:     Too soon to determine     Electronically signed by Lisa Augustin RD on 3/11/2022 at 4:04 PM    Contact: [phone] [Follow-Up: _____] : a [unfilled] follow-up visit

## (undated) DEVICE — INTENDED FOR TISSUE SEPARATION, AND OTHER PROCEDURES THAT REQUIRE A SHARP SURGICAL BLADE TO PUNCTURE OR CUT.: Brand: BARD-PARKER SAFETY BLADES SIZE 10, STERILE

## (undated) DEVICE — FLEX ADVANTAGE 3000CC: Brand: FLEX ADVANTAGE

## (undated) DEVICE — 3M™ BAIR PAWS FLEX™ WARMING GOWN, STANDARD, 20 PER CASE 81003: Brand: BAIR PAWS™

## (undated) DEVICE — TRAY PREP DRY W/ PREM GLV 2 APPL 6 SPNG 2 UNDPD 1 OVERWRAP

## (undated) DEVICE — SOLUTION SCRB 4OZ 10% PVP I POVIDONE IOD TOP PAINT EXIDINE

## (undated) DEVICE — SOLUTION IRRIG 3000ML 0.9% SOD CHL FLX CONT 0797208] ICU MEDICAL INC]

## (undated) DEVICE — ELECTRODE PT RET AD L9FT HI MOIST COND ADH HYDRGEL CORDED

## (undated) DEVICE — GOWN,PREVENTION PLUS,XLN/XL,ST,24/CS: Brand: MEDLINE

## (undated) DEVICE — SOLUTION IV 1000ML 0.9% SOD CHL

## (undated) DEVICE — TAPE ADH CLTH SILK H2O REPELLENT CURAD

## (undated) DEVICE — GAUZE,SPONGE,4"X4",16PLY,STRL,LF,10/TRAY: Brand: MEDLINE

## (undated) DEVICE — NITINOL STONE RETRIEVAL BASKET: Brand: ZERO TIP

## (undated) DEVICE — SYRINGE MED 10ML TRNSLUC BRL PLUNG BLK MRK POLYPR CTRL

## (undated) DEVICE — GDWIRE 3CM FLX-TIP 0.038X150CM -- BX/5 SENSOR

## (undated) DEVICE — KIT CLN UP BON SECOURS MARYV

## (undated) DEVICE — (D)SYR 10ML 1/5ML GRAD NSAF -- PKGING CHANGE USE ITEM 338027

## (undated) DEVICE — (D)LUB GEL SURG SURGLUB 2OZ -- DISC BY MFR USE ITEM 292507

## (undated) DEVICE — INTENDED FOR TISSUE SEPARATION, AND OTHER PROCEDURES THAT REQUIRE A SHARP SURGICAL BLADE TO PUNCTURE OR CUT.: Brand: BARD-PARKER SAFETY BLADES SIZE 15, STERILE

## (undated) DEVICE — BLADE ES L6IN ELASTOMERIC COAT EXT DURABLE BEND UPTO 90DEG

## (undated) DEVICE — Z DUP USE 2565107 PACK SURG PROC LEG CYSTO T-DRAPE REINF TBL CVR HND TWL

## (undated) DEVICE — SUTURE VCRL SZ 3-0 L18IN ABSRB UD L26MM SH 1/2 CIR J864D

## (undated) DEVICE — PACK PROCEDURE SURG MAJ W/ BASIN LF

## (undated) DEVICE — KENDALL SCD EXPRESS SLEEVES, KNEE LENGTH, MEDIUM: Brand: KENDALL SCD

## (undated) DEVICE — STERILE POLYISOPRENE POWDER-FREE SURGICAL GLOVES: Brand: PROTEXIS

## (undated) DEVICE — BAG DRAINAGE CUST DISP

## (undated) DEVICE — GAUZE SPONGES,16 PLY: Brand: CURITY

## (undated) DEVICE — SUTURE VCRL SZ 0 L18IN ABSRB UD POLYGLACTIN 910 BRAID TIE J912G

## (undated) DEVICE — OPEN-END FLEXI-TIP URETERAL CATHETER: Brand: FLEXI-TIP

## (undated) DEVICE — STER SINGLE BASIN SET W/BOWLS: Brand: CARDINAL HEALTH

## (undated) DEVICE — SEAL INSTR CYSTO ADJ BX PRT SEAL FOR ACC UP TO 6FR

## (undated) DEVICE — THREE-QUARTER SHEET: Brand: CONVERTORS

## (undated) DEVICE — SUTURE PDS II SZ 1 L96IN ABSRB VLT TP-1 L65MM 1/2 CIR Z880G

## (undated) DEVICE — TUBING IRRIG L77IN DIA0.241IN L BOR FOR CYSTO W/ NVENT